# Patient Record
Sex: MALE | Race: WHITE | NOT HISPANIC OR LATINO | Employment: FULL TIME
[De-identification: names, ages, dates, MRNs, and addresses within clinical notes are randomized per-mention and may not be internally consistent; named-entity substitution may affect disease eponyms.]

---

## 2024-07-08 ENCOUNTER — TRANSCRIBE ORDERS (OUTPATIENT)
Dept: OTHER | Age: 79
End: 2024-07-08

## 2024-07-08 DIAGNOSIS — R74.8 ELEVATED LIVER ENZYMES: Primary | ICD-10-CM

## 2024-07-08 DIAGNOSIS — K75.81 STEATOHEPATITIS: ICD-10-CM

## 2024-07-10 NOTE — CONFIDENTIAL NOTE
DIAGNOSIS:   Elevated liver enzymes   Steatohepatitis      Appt Date:  08.01.2024    NOTES STATUS DETAILS   OFFICE NOTE from referring provider Care Everywhere 07.02.2024  Dr [facility]   [facility]      MEDICATION LIST Care Everywhere    IMAGING     ULTRASOUND LIVER Care Everywhere 06.17.2024 [facility]    LABS     HEPATIC PANEL (LIVER PANEL) Care Everywhere 06.10.2024   BASIC METABOLIC PANEL Care Everywhere 06.10.2024    COMPLETE METABOLIC PANEL Care Everywhere 04.05.2024   COMPLETE BLOOD COUNT (CBC) Care Everywhere 04.05.2024      Action 07.10.2024 rm   Action Taken Called [facility] at [phone] image received and uploaded to Pac[facility].

## 2024-07-16 DIAGNOSIS — K76.0 FATTY LIVER: ICD-10-CM

## 2024-07-16 DIAGNOSIS — R79.89 ABNORMAL LFTS: Primary | ICD-10-CM

## 2024-07-16 DIAGNOSIS — Z11.59 ENCOUNTER FOR SCREENING FOR OTHER VIRAL DISEASES: ICD-10-CM

## 2024-08-01 ENCOUNTER — LAB (OUTPATIENT)
Dept: LAB | Facility: CLINIC | Age: 79
End: 2024-08-01
Payer: COMMERCIAL

## 2024-08-01 ENCOUNTER — TELEPHONE (OUTPATIENT)
Dept: GASTROENTEROLOGY | Facility: CLINIC | Age: 79
End: 2024-08-01

## 2024-08-01 ENCOUNTER — OFFICE VISIT (OUTPATIENT)
Dept: GASTROENTEROLOGY | Facility: CLINIC | Age: 79
End: 2024-08-01
Attending: INTERNAL MEDICINE
Payer: COMMERCIAL

## 2024-08-01 ENCOUNTER — PRE VISIT (OUTPATIENT)
Dept: GASTROENTEROLOGY | Facility: CLINIC | Age: 79
End: 2024-08-01

## 2024-08-01 VITALS
DIASTOLIC BLOOD PRESSURE: 83 MMHG | OXYGEN SATURATION: 95 % | HEART RATE: 74 BPM | SYSTOLIC BLOOD PRESSURE: 135 MMHG | WEIGHT: 199 LBS | RESPIRATION RATE: 18 BRPM | TEMPERATURE: 97.8 F

## 2024-08-01 DIAGNOSIS — R74.8 ELEVATED LIVER ENZYMES: ICD-10-CM

## 2024-08-01 DIAGNOSIS — R79.89 ABNORMAL LFTS: ICD-10-CM

## 2024-08-01 DIAGNOSIS — K76.0 FATTY LIVER: ICD-10-CM

## 2024-08-01 DIAGNOSIS — Z11.59 ENCOUNTER FOR SCREENING FOR OTHER VIRAL DISEASES: ICD-10-CM

## 2024-08-01 DIAGNOSIS — R74.8 ELEVATED LIVER ENZYMES: Primary | ICD-10-CM

## 2024-08-01 DIAGNOSIS — K75.81 STEATOHEPATITIS: ICD-10-CM

## 2024-08-01 PROBLEM — S06.5XAA SDH (SUBDURAL HEMATOMA) (H): Status: ACTIVE | Noted: 2024-04-04

## 2024-08-01 PROBLEM — E11.9 DM (DIABETES MELLITUS), TYPE 2 (H): Status: ACTIVE | Noted: 2023-06-09

## 2024-08-01 LAB
ALBUMIN SERPL BCG-MCNC: 3.9 G/DL (ref 3.5–5.2)
ALP SERPL-CCNC: 184 U/L (ref 40–150)
ALT SERPL W P-5'-P-CCNC: 342 U/L (ref 0–70)
ANION GAP SERPL CALCULATED.3IONS-SCNC: 8 MMOL/L (ref 7–15)
AST SERPL W P-5'-P-CCNC: 365 U/L (ref 0–45)
BILIRUB DIRECT SERPL-MCNC: 0.24 MG/DL (ref 0–0.3)
BILIRUB SERPL-MCNC: 0.7 MG/DL
BUN SERPL-MCNC: 19.3 MG/DL (ref 8–23)
CALCIUM SERPL-MCNC: 9.1 MG/DL (ref 8.8–10.4)
CHLORIDE SERPL-SCNC: 106 MMOL/L (ref 98–107)
CREAT SERPL-MCNC: 1.16 MG/DL (ref 0.67–1.17)
EGFRCR SERPLBLD CKD-EPI 2021: 64 ML/MIN/1.73M2
ERYTHROCYTE [DISTWIDTH] IN BLOOD BY AUTOMATED COUNT: 15.6 % (ref 10–15)
GLUCOSE SERPL-MCNC: 104 MG/DL (ref 70–99)
HBV CORE AB SERPL QL IA: NONREACTIVE
HBV SURFACE AB SERPL IA-ACNC: 6.61 M[IU]/ML
HBV SURFACE AB SERPL IA-ACNC: NONREACTIVE M[IU]/ML
HBV SURFACE AG SERPL QL IA: NONREACTIVE
HCO3 SERPL-SCNC: 27 MMOL/L (ref 22–29)
HCT VFR BLD AUTO: 42.1 % (ref 40–53)
HCV AB SERPL QL IA: NONREACTIVE
HGB BLD-MCNC: 13.3 G/DL (ref 13.3–17.7)
INR PPP: 1.15 (ref 0.85–1.15)
MCH RBC QN AUTO: 28.3 PG (ref 26.5–33)
MCHC RBC AUTO-ENTMCNC: 31.6 G/DL (ref 31.5–36.5)
MCV RBC AUTO: 90 FL (ref 78–100)
PLATELET # BLD AUTO: 180 10E3/UL (ref 150–450)
POTASSIUM SERPL-SCNC: 3.7 MMOL/L (ref 3.4–5.3)
PROT SERPL-MCNC: 8.2 G/DL (ref 6.4–8.3)
RBC # BLD AUTO: 4.7 10E6/UL (ref 4.4–5.9)
SODIUM SERPL-SCNC: 141 MMOL/L (ref 135–145)
WBC # BLD AUTO: 5.8 10E3/UL (ref 4–11)

## 2024-08-01 PROCEDURE — 86706 HEP B SURFACE ANTIBODY: CPT | Performed by: INTERNAL MEDICINE

## 2024-08-01 PROCEDURE — 36415 COLL VENOUS BLD VENIPUNCTURE: CPT | Performed by: PATHOLOGY

## 2024-08-01 PROCEDURE — 83516 IMMUNOASSAY NONANTIBODY: CPT | Mod: 90 | Performed by: PATHOLOGY

## 2024-08-01 PROCEDURE — 82248 BILIRUBIN DIRECT: CPT | Performed by: PATHOLOGY

## 2024-08-01 PROCEDURE — 85610 PROTHROMBIN TIME: CPT | Performed by: PATHOLOGY

## 2024-08-01 PROCEDURE — 86704 HEP B CORE ANTIBODY TOTAL: CPT | Performed by: INTERNAL MEDICINE

## 2024-08-01 PROCEDURE — 86803 HEPATITIS C AB TEST: CPT | Performed by: INTERNAL MEDICINE

## 2024-08-01 PROCEDURE — G0463 HOSPITAL OUTPT CLINIC VISIT: HCPCS | Performed by: INTERNAL MEDICINE

## 2024-08-01 PROCEDURE — 87340 HEPATITIS B SURFACE AG IA: CPT | Performed by: INTERNAL MEDICINE

## 2024-08-01 PROCEDURE — 85027 COMPLETE CBC AUTOMATED: CPT | Performed by: PATHOLOGY

## 2024-08-01 PROCEDURE — 99204 OFFICE O/P NEW MOD 45 MIN: CPT | Performed by: INTERNAL MEDICINE

## 2024-08-01 PROCEDURE — 99000 SPECIMEN HANDLING OFFICE-LAB: CPT | Performed by: PATHOLOGY

## 2024-08-01 PROCEDURE — 80053 COMPREHEN METABOLIC PANEL: CPT | Performed by: PATHOLOGY

## 2024-08-01 PROCEDURE — 86381 MITOCHONDRIAL ANTIBODY EACH: CPT | Performed by: INTERNAL MEDICINE

## 2024-08-01 PROCEDURE — 86256 FLUORESCENT ANTIBODY TITER: CPT | Mod: 90 | Performed by: PATHOLOGY

## 2024-08-01 RX ORDER — ASPIRIN 325 MG
325 TABLET, DELAYED RELEASE (ENTERIC COATED) ORAL DAILY
COMMUNITY

## 2024-08-01 RX ORDER — ATORVASTATIN CALCIUM 40 MG/1
1 TABLET, FILM COATED ORAL DAILY
COMMUNITY
Start: 2024-06-12

## 2024-08-01 RX ORDER — METOPROLOL TARTRATE 25 MG/1
1 TABLET, FILM COATED ORAL 2 TIMES DAILY
COMMUNITY
Start: 2024-05-28

## 2024-08-01 ASSESSMENT — PAIN SCALES - GENERAL: PAINLEVEL: NO PAIN (0)

## 2024-08-01 NOTE — LETTER
8/1/2024      Haim Araya  [address]  [place] [address]      Dear Colleague,    Thank you for referring your patient, Haim Araya, to the [facility] [place]. Please see a copy of my visit note below.    [facility]       Hepatology Clinic    Date of Service: 8/1/2024       Primary Care Provider: Segundo Corona    History of Present Illness     Mr. Araya is sent in consultation by Dr. Corona because of abnormal liver enzymes.    His abnormal liver tests were apparently discovered incidentally on routine labs. He reports no knowledge of prior liver problems.  There is no family history of liver disease.  He reports no significant alcohol use.    His constitutional, GI, and hepatic review of systems are negative.  He recently had a leg fracture and is undergoing physical therapy for that.    He has a history of a CVA in 2014, but reports no recent symptoms.  He is on daily aspirin for this.    He owns a small electronics company.      Past Medical History:  No past medical history on file.    Patient Active Problem List   Diagnosis     Cerebral infarction (H)     DM (diabetes mellitus), type 2 (H)     Dyslipidemia     GERD (gastroesophageal reflux disease)     HTN (hypertension)     SDH (subdural hematoma) (H)       Surgical History:  No past surgical history on file.    Social History:  Social History     Tobacco Use     Smoking status: Never     Smokeless tobacco: Never   Substance Use Topics     Alcohol use: Not Currently     Drug use: Not Currently       Family History:  No family history on file.    Medications:  Current Outpatient Medications   Medication Sig Dispense Refill     aspirin (ASA) 325 MG EC tablet Take 325 mg by mouth daily       atorvastatin (LIPITOR) 40 MG tablet Take 1 tablet by mouth daily       metoprolol tartrate (LOPRESSOR) 25 MG tablet Take 1 tablet by mouth 2 times daily       Multiple  Vitamins-Minerals (SENIOR MULTIVITAMIN PLUS PO) Take 1 tablet by mouth daily       No current facility-administered medications for this visit.         Objective:         Vitals:    08/01/24 1207   BP: 135/83   BP Location: Right arm   Patient Position: Sitting   Cuff Size: Adult Regular   Pulse: 74   Resp: 18   Temp: 97.8  F (36.6  C)   TempSrc: Oral   SpO2: 95%   Weight: 90.3 kg (199 lb)     There is no height or weight on file to calculate BMI.     Physical Exam  Constitutional: Talkative, well-developed, well-nourished, in no apparent distress.    HEENT: Normocephalic. No scleral icterus.   Abd: Not distended, non-tender. No overt hepatosplenomegaly.     Skin:  No rash noted.  No spider nevi noted.    Peripheral Vascular: No lower extremity edema.   Musculoskeletal:  ROM intact, grossly normal muscle bulk    Psychiatric: Normal mood and affect. Behavior is normal.  Neuro: No asterixis    Labs and Diagnostic tests:  Lab Results   Component Value Date     08/01/2024    POTASSIUM 3.7 08/01/2024    CHLORIDE 106 08/01/2024    CO2 27 08/01/2024    BUN 19.3 08/01/2024    CR 1.16 08/01/2024     Lab Results   Component Value Date    BILITOTAL 0.7 08/01/2024     08/01/2024     08/01/2024    ALKPHOS 184 08/01/2024     Lab Results   Component Value Date    ALBUMIN 3.9 08/01/2024    PROTTOTAL 8.2 08/01/2024      Lab Results   Component Value Date    WBC 5.8 08/01/2024    HGB 13.3 08/01/2024    MCV 90 08/01/2024     08/01/2024     Lab Results   Component Value Date    INR 1.15 08/01/2024       MELD 3.0: 9 at 8/1/2024 11:13 AM  MELD-Na: 10 at 8/1/2024 11:13 AM  Calculated from:  Serum Creatinine: 1.16 mg/dL at 8/1/2024 11:13 AM  Serum Sodium: 141 mmol/L (Using max of 137 mmol/L) at 8/1/2024 11:13 AM  Total Bilirubin: 0.7 mg/dL (Using min of 1 mg/dL) at 8/1/2024 11:13 AM  Serum Albumin: 3.9 g/dL (Using max of 3.5 g/dL) at 8/1/2024 11:13 AM  INR(ratio): 1.15 at 8/1/2024 11:13 AM  Age at listing  (hypothetical): 79 years  Sex: Male at 8/1/2024 11:13 AM    US ([facility]) 6-17-24  Impression    1.  Hepatic steatosis. No liver mass. Consider steatohepatitis.  2.  Cholelithiasis.    Iron sat and ceruloplasmin normal.  HCV negative,       Assessment and Plan:    Abnormal liver enzymes.  These have been present for a number of years but have worsened recently.  The patient reports no new medications.  In the past, he had taken significant doses of acetaminophen, but has not been on this recently.    His ultrasound suggest steatosis of the liver, which certainly could be a contributing factor.  However, given the degree of liver enzyme elevation, I think it is reasonable to proceed with a liver biopsy.  We discussed this at some length, and the patient is willing to proceed.    Prior testing has not shown evidence of hepatitis B or C in this patient.  Of note, his wife has had hepatitis B, and he does not recall being vaccinated for this.  I suggest that he get this vaccination through Dr. Corona's office.    Pending tests today include a mitochondrial antibody and smooth muscle antibody.    I will plan on seeing this patient back in clinic after the liver biopsy.    His many questions were answered.      About 45 minutes spent today with patient, reviewing results, and coordinating care.    This note was created with voice recognition software, and while reviewed for accuracy, typos may remain.        Hamlet Decker MD  Professor of Medicine  [facility]  Division of Gastroenterology, Hepatology, and Nutrition       Again, thank you for allowing me to participate in the care of your patient.        Sincerely,        Hamlet Decker MD

## 2024-08-01 NOTE — PROGRESS NOTES
[facility]       Hepatology Clinic    Date of Service: 8/1/2024       Primary Care Provider: Segundo Corona    History of Present Illness     Mr. Araya is sent in consultation by Dr. Corona because of abnormal liver enzymes.    His abnormal liver tests were apparently discovered incidentally on routine labs. He reports no knowledge of prior liver problems.  There is no family history of liver disease.  He reports no significant alcohol use.    His constitutional, GI, and hepatic review of systems are negative.  He recently had a leg fracture and is undergoing physical therapy for that.    He has a history of a CVA in 2014, but reports no recent symptoms.  He is on daily aspirin for this.    He owns a small electronics company.      Past Medical History:  No past medical history on file.    Patient Active Problem List   Diagnosis    Cerebral infarction (H)    DM (diabetes mellitus), type 2 (H)    Dyslipidemia    GERD (gastroesophageal reflux disease)    HTN (hypertension)    SDH (subdural hematoma) (H)       Surgical History:  No past surgical history on file.    Social History:  Social History     Tobacco Use    Smoking status: Never    Smokeless tobacco: Never   Substance Use Topics    Alcohol use: Not Currently    Drug use: Not Currently       Family History:  No family history on file.    Medications:  Current Outpatient Medications   Medication Sig Dispense Refill    aspirin (ASA) 325 MG EC tablet Take 325 mg by mouth daily      atorvastatin (LIPITOR) 40 MG tablet Take 1 tablet by mouth daily      metoprolol tartrate (LOPRESSOR) 25 MG tablet Take 1 tablet by mouth 2 times daily      Multiple Vitamins-Minerals (SENIOR MULTIVITAMIN PLUS PO) Take 1 tablet by mouth daily       No current facility-administered medications for this visit.         Objective:         Vitals:    08/01/24 1207   BP: 135/83   BP Location: Right arm   Patient Position: Sitting   Cuff Size: Adult  Regular   Pulse: 74   Resp: 18   Temp: 97.8  F (36.6  C)   TempSrc: Oral   SpO2: 95%   Weight: 90.3 kg (199 lb)     There is no height or weight on file to calculate BMI.     Physical Exam  Constitutional: Talkative, well-developed, well-nourished, in no apparent distress.    HEENT: Normocephalic. No scleral icterus.   Abd: Not distended, non-tender. No overt hepatosplenomegaly.     Skin:  No rash noted.  No spider nevi noted.    Peripheral Vascular: No lower extremity edema.   Musculoskeletal:  ROM intact, grossly normal muscle bulk    Psychiatric: Normal mood and affect. Behavior is normal.  Neuro: No asterixis    Labs and Diagnostic tests:  Lab Results   Component Value Date     08/01/2024    POTASSIUM 3.7 08/01/2024    CHLORIDE 106 08/01/2024    CO2 27 08/01/2024    BUN 19.3 08/01/2024    CR 1.16 08/01/2024     Lab Results   Component Value Date    BILITOTAL 0.7 08/01/2024     08/01/2024     08/01/2024    ALKPHOS 184 08/01/2024     Lab Results   Component Value Date    ALBUMIN 3.9 08/01/2024    PROTTOTAL 8.2 08/01/2024      Lab Results   Component Value Date    WBC 5.8 08/01/2024    HGB 13.3 08/01/2024    MCV 90 08/01/2024     08/01/2024     Lab Results   Component Value Date    INR 1.15 08/01/2024       MELD 3.0: 9 at 8/1/2024 11:13 AM  MELD-Na: 10 at 8/1/2024 11:13 AM  Calculated from:  Serum Creatinine: 1.16 mg/dL at 8/1/2024 11:13 AM  Serum Sodium: 141 mmol/L (Using max of 137 mmol/L) at 8/1/2024 11:13 AM  Total Bilirubin: 0.7 mg/dL (Using min of 1 mg/dL) at 8/1/2024 11:13 AM  Serum Albumin: 3.9 g/dL (Using max of 3.5 g/dL) at 8/1/2024 11:13 AM  INR(ratio): 1.15 at 8/1/2024 11:13 AM  Age at listing (hypothetical): 79 years  Sex: Male at 8/1/2024 11:13 AM    US ([facility]) 6-17-24  Impression    1.  Hepatic steatosis. No liver mass. Consider steatohepatitis.  2.  Cholelithiasis.    Iron sat and ceruloplasmin normal.  HCV negative,       Assessment and Plan:    Abnormal liver enzymes.   These have been present for a number of years but have worsened recently.  The patient reports no new medications.  In the past, he had taken significant doses of acetaminophen, but has not been on this recently.    His ultrasound suggest steatosis of the liver, which certainly could be a contributing factor.  However, given the degree of liver enzyme elevation, I think it is reasonable to proceed with a liver biopsy.  We discussed this at some length, and the patient is willing to proceed.    Prior testing has not shown evidence of hepatitis B or C in this patient.  Of note, his wife has had hepatitis B, and he does not recall being vaccinated for this.  I suggest that he get this vaccination through Dr. Corona's office.    Pending tests today include a mitochondrial antibody and smooth muscle antibody.    I will plan on seeing this patient back in clinic after the liver biopsy.    His many questions were answered.      About 45 minutes spent today with patient, reviewing results, and coordinating care.    This note was created with voice recognition software, and while reviewed for accuracy, typos may remain.        Hamlet Decker MD  Professor of Medicine  [facility]  Division of Gastroenterology, Hepatology, and Nutrition

## 2024-08-01 NOTE — PATIENT INSTRUCTIONS
Summary:    1.  The cause of your elevated liver enzyme tests is unclear.  Steatotic liver disease (MASLD, formally known as fatty liver) is one possibility.  However, I think it is reasonable to proceed with a liver biopsy, as we discussed, to investigate other possibilities.  This has been ordered, you will be contacted about scheduling.    2.  We will plan on a follow-up appointment in later August to discuss your results.  This might be scheduled at the [facility].    If you have any questions about your liver disease care or tests, you can call the clinic at [phone].

## 2024-08-01 NOTE — TELEPHONE ENCOUNTER
Contacted [facility] to push 4/4/24 CT chest/abdomen/pelvis to PACS for liver biopsy planning. Per film room, image cannot be pushed via PACS. Sending a disk would be an option.     Addendum: Formal request made for 4/4/24 CT chest/abdomen/pelvis be mailed to hepatology.     JORGE HooperN, RN, PHN  Hepatology Clinic  Cl[facility]ics [facility] Center  [facility]

## 2024-08-02 LAB — MITOCHONDRIA M2 IGG SER-ACNC: 3.2 U/ML

## 2024-08-02 NOTE — TELEPHONE ENCOUNTER
TAWANDA left with imaging request with [facility] to ask if CD was overnighted.    JORGE HooperN, RN, PHN  Hepatology Clinic  Cl[facility]ics [facility] Surgery Center  [facility]

## 2024-08-04 LAB
SMA IGG SER-ACNC: >100 UNITS
SMOOTH MUSCLE IGG TITR SER: NORMAL {TITER}

## 2024-08-05 NOTE — RESULT ENCOUNTER NOTE
Mr. Araya:    The elevated F-actin antibody suggests that you might have a condition called autoimmune hepatitis, and the liver biopsy will help sort things out. However, this antibody test can sometimes be elevated in patients without a liver problem.    We can discuss things further when we meet you back in clinic after your liver biopsy.    If you have any questions about your liver disease care or tests, you can call the clinic at [phone].     - Dr. Decker

## 2024-08-06 ENCOUNTER — TELEPHONE (OUTPATIENT)
Dept: GASTROENTEROLOGY | Facility: CLINIC | Age: 79
End: 2024-08-06
Payer: COMMERCIAL

## 2024-08-06 NOTE — TELEPHONE ENCOUNTER
Multiple phone calls made to film room team for update on the CD that needs to be sent ASAP for liver biopsy prep.    SALLY Hooper, RN, PHN  Hepatology Clinic  Clinics & Surgery Center  [facility]

## 2024-08-07 ENCOUNTER — DOCUMENTATION ONLY (OUTPATIENT)
Dept: GASTROENTEROLOGY | Facility: CLINIC | Age: 79
End: 2024-08-07
Payer: COMMERCIAL

## 2024-08-07 NOTE — CONFIDENTIAL NOTE
Action 08.07.2024 rm   Action Taken Received disc from [facility], took to 4n to be uploaded to PACS.     Action 08.12.2024 RM   Action Taken Imaging disc received from [facility] took to 4 n to be uploaded to PACS

## 2024-08-18 ENCOUNTER — HEALTH MAINTENANCE LETTER (OUTPATIENT)
Age: 79
End: 2024-08-18

## 2024-08-20 ENCOUNTER — HOSPITAL ENCOUNTER (OUTPATIENT)
Facility: AMBULATORY SURGERY CENTER | Age: 79
Discharge: HOME | End: 2024-08-20
Attending: RADIOLOGY | Admitting: RADIOLOGY
Payer: COMMERCIAL

## 2024-08-20 ENCOUNTER — ANCILLARY PROCEDURE (OUTPATIENT)
Dept: RADIOLOGY | Facility: AMBULATORY SURGERY CENTER | Age: 79
End: 2024-08-20
Attending: INTERNAL MEDICINE
Payer: COMMERCIAL

## 2024-08-20 VITALS
BODY MASS INDEX: 27.99 KG/M2 | WEIGHT: 195.5 LBS | HEIGHT: 70 IN | SYSTOLIC BLOOD PRESSURE: 126 MMHG | TEMPERATURE: 97 F | OXYGEN SATURATION: 95 % | DIASTOLIC BLOOD PRESSURE: 85 MMHG | HEART RATE: 85 BPM | RESPIRATION RATE: 16 BRPM

## 2024-08-20 DIAGNOSIS — R74.8 ELEVATED LIVER ENZYMES: ICD-10-CM

## 2024-08-20 PROCEDURE — 99152 MOD SED SAME PHYS/QHP 5/>YRS: CPT | Performed by: RADIOLOGY

## 2024-08-20 PROCEDURE — 88313 SPECIAL STAINS GROUP 2: CPT | Mod: 26 | Performed by: PATHOLOGY

## 2024-08-20 PROCEDURE — 88313 SPECIAL STAINS GROUP 2: CPT | Mod: TC | Performed by: RADIOLOGY

## 2024-08-20 PROCEDURE — 76942 ECHO GUIDE FOR BIOPSY: CPT | Mod: 26 | Performed by: RADIOLOGY

## 2024-08-20 PROCEDURE — 88307 TISSUE EXAM BY PATHOLOGIST: CPT | Mod: 26 | Performed by: PATHOLOGY

## 2024-08-20 PROCEDURE — 47000 NEEDLE BIOPSY OF LIVER PERQ: CPT | Performed by: RADIOLOGY

## 2024-08-20 PROCEDURE — 47000 NEEDLE BIOPSY OF LIVER PERQ: CPT

## 2024-08-20 RX ORDER — NALOXONE HYDROCHLORIDE 0.4 MG/ML
0.4 INJECTION, SOLUTION INTRAMUSCULAR; INTRAVENOUS; SUBCUTANEOUS
Status: DISCONTINUED | OUTPATIENT
Start: 2024-08-20 | End: 2024-08-21 | Stop reason: HOSPADM

## 2024-08-20 RX ORDER — FLUMAZENIL 0.1 MG/ML
0.2 INJECTION, SOLUTION INTRAVENOUS
Status: DISCONTINUED | OUTPATIENT
Start: 2024-08-20 | End: 2024-08-21 | Stop reason: HOSPADM

## 2024-08-20 RX ORDER — SODIUM CHLORIDE 9 MG/ML
INJECTION, SOLUTION INTRAVENOUS CONTINUOUS
Status: DISCONTINUED | OUTPATIENT
Start: 2024-08-20 | End: 2024-08-21 | Stop reason: HOSPADM

## 2024-08-20 RX ORDER — NALOXONE HYDROCHLORIDE 0.4 MG/ML
0.2 INJECTION, SOLUTION INTRAMUSCULAR; INTRAVENOUS; SUBCUTANEOUS
Status: DISCONTINUED | OUTPATIENT
Start: 2024-08-20 | End: 2024-08-21 | Stop reason: HOSPADM

## 2024-08-20 RX ORDER — FENTANYL CITRATE 50 UG/ML
25-50 INJECTION, SOLUTION INTRAMUSCULAR; INTRAVENOUS EVERY 5 MIN PRN
Status: DISCONTINUED | OUTPATIENT
Start: 2024-08-20 | End: 2024-08-21 | Stop reason: HOSPADM

## 2024-08-20 RX ORDER — LIDOCAINE 40 MG/G
CREAM TOPICAL
Status: DISCONTINUED | OUTPATIENT
Start: 2024-08-20 | End: 2024-08-21 | Stop reason: HOSPADM

## 2024-08-20 NOTE — BRIEF OP NOTE
[facility]    Brief Operative Note    Pre-operative diagnosis: Elevated liver enzymes [R74.8]  Post-operative diagnosis Same as pre-operative diagnosis    Procedure: Percutaneous biopsy liver, Right - Abdomen    Surgeon: Surgeons and Role:     * Fareed Adams MD - Primary  Anesthesia: Moderate Sedation   Estimated Blood Loss: Minimal    Drains: None  Specimens:   ID Type Source Tests Collected by Time Destination   1 : Liver Biopsy x 3 Cores Biopsy Liver SURGICAL PATHOLOGY EXAM Fareed Adams MD 8/20/2024  9:18 AM      Findings:   None.  Complications: None.  Implants: * No implants in log *        17 gauge coaxial needle placed in liver via right intercostal approach. Three passes with a Apterano ACT biopsy needle through the coaxial needle and three good 18 gauge cores obtained and submitted in formalin. Good hemostasis with gelatin plugs. [id number] dressing.    1 mg Versed and 50 mcg Fentanyl IV.    Attending face-to-face sedation time: 12 minutes.

## 2024-08-20 NOTE — CONSULTS
Interventional Radiology Pre-Procedure Sedation Assessment   Time of Assessment: 8:37 AM    Expected Level: Moderate Sedation    Indication: Sedation is required for the following type of Procedure: Liver biopsy    Sedation and procedural consent: Risks, benefits and alternatives were discussed with Patient    PO Intake: Appropriately NPO for procedure    ASA Class: Class 2 - MILD SYSTEMIC DISEASE, NO ACUTE PROBLEMS, NO FUNCTIONAL LIMITATIONS.    Mallampati: Grade 2:  Soft palate, base of uvula, tonsillar pillars, and portion of posterior pharyngeal wall visible    Lungs: Lungs Clear with good breath sounds bilaterally    Heart: Normal heart sounds and rate    History and physical reviewed and no updates needed.Brief H&P completed prior to moderate sedation. I have reviewed the lab findings, diagnostic data, medications, and the plan for sedation. I have determined this patient to be an appropriate candidate for the planned sedation and procedure and have reassessed the patient IMMEDIATELY PRIOR to sedation and procedure.    Olena Montana PA-C

## 2024-08-20 NOTE — DISCHARGE INSTRUCTIONS
A collaboration between [facility] and [facility]  Experts in minimally invasive, targeted treatments performed using imaging guidance    Liver Biopsy    Today you had a needle remove a piece of tissue from your liver.    After you go home:  Keep any applied tape/gauze dressings clean and dry.  Change tape/gauze dressings if they get wet or soiled.  You may remove the dressing 48 hours after the procedure.  Don't shower until 48 hours after the procedure.  Do not perform strenuous activities or lift greater than 10 pounds for the next three days.  If there is bleeding or oozing from the procedure site, apply firm pressure to the area for 5-10 minutes.  If the bleeding continues seek medical advice at the numbers below.    For 24 hours after any sedation used:  Relax and take it easy.  No strenuous activities.  Do not drive or operate machines at home or at work.  No alcohol consumption.  Do not make any important or legal decisions.    Call our Interventional Radiology (IR) service if:  If you start bleeding from the procedure site.  If you do start to bleed from the site, lie down and hold some pressure on the site.  Our radiology provider can help you decide if you need to return to the hospital.  If you feel dizzy or lightheaded.  If you suddenly feel short of breath.  If you have persistent pain at biopsy site.  If the skin around the biopsy site is swollen, reddened, painful, or has any discharge.  If you feel nauseated and  just not right .  If you have a fever of greater than 100.5  F and chills in the first 5 days after procedure.  Any other concerns related to your procedure.    [facility]  Interventional Radiology (IR)  [address] [place] [address]    Contact Number:  [phone]  (IR control desk)  Monday - Friday 8:00 am - 4:30 pm    After hours for urgent concerns:   [phone]  After 4:30 pm Monday - Friday, Weekends and Holidays.   Ask for Interventional Radiology on-call.  Someone is available 24 hours a day.  [facility] toll free number:  [phone]

## 2024-08-20 NOTE — BRIEF OP NOTE
[facility] [place]    Brief Operative Note    Pre-operative diagnosis: Elevated liver enzymes [R74.8]  Post-operative diagnosis Same as pre-operative diagnosis    Procedure: Percutaneous biopsy liver, Right - Abdomen    Surgeon: Surgeons and Role:     * Fareed Adams MD - Primary  Anesthesia: Moderate Sedation   Estimated Blood Loss: Minimal    Drains: None  Specimens:   ID Type Source Tests Collected by Time Destination   1 : Liver Biopsy x 3 Cores Biopsy Liver SURGICAL PATHOLOGY EXAM Fareed Adams MD 8/20/2024  9:18 AM      Findings:   Ultrasound guided native liver biopsy, three passes, three core specimens obtained. Gelfoam administered into biopsy track. Plan for 1 hour of bedrest.   Complications: None.  Implants: * No implants in log *

## 2024-08-21 LAB
PATH REPORT.COMMENTS IMP SPEC: NORMAL
PATH REPORT.FINAL DX SPEC: NORMAL
PATH REPORT.GROSS SPEC: NORMAL
PATH REPORT.MICROSCOPIC SPEC OTHER STN: NORMAL
PATH REPORT.RELEVANT HX SPEC: NORMAL
PHOTO IMAGE: NORMAL

## 2024-08-22 ENCOUNTER — LAB (OUTPATIENT)
Dept: LAB | Facility: CLINIC | Age: 79
End: 2024-08-22
Payer: COMMERCIAL

## 2024-08-22 ENCOUNTER — TELEPHONE (OUTPATIENT)
Dept: GASTROENTEROLOGY | Facility: CLINIC | Age: 79
End: 2024-08-22

## 2024-08-22 DIAGNOSIS — R74.8 ELEVATED LIVER ENZYMES: ICD-10-CM

## 2024-08-22 DIAGNOSIS — K75.4 AUTOIMMUNE HEPATITIS (H): ICD-10-CM

## 2024-08-22 DIAGNOSIS — K75.4 AUTOIMMUNE HEPATITIS (H): Primary | ICD-10-CM

## 2024-08-22 DIAGNOSIS — R74.8 ELEVATED LIVER ENZYMES: Primary | ICD-10-CM

## 2024-08-22 LAB
ALBUMIN SERPL BCG-MCNC: 3.4 G/DL (ref 3.5–5.2)
ALP SERPL-CCNC: 132 U/L (ref 40–150)
ALT SERPL W P-5'-P-CCNC: 133 U/L (ref 0–70)
AST SERPL W P-5'-P-CCNC: 146 U/L (ref 0–45)
BILIRUB DIRECT SERPL-MCNC: 0.21 MG/DL (ref 0–0.3)
BILIRUB SERPL-MCNC: 0.7 MG/DL
PROT SERPL-MCNC: 7.7 G/DL (ref 6.4–8.3)

## 2024-08-22 PROCEDURE — 99000 SPECIMEN HANDLING OFFICE-LAB: CPT

## 2024-08-22 PROCEDURE — 84433 ASY THIOPURIN S-MTHYLTRNSFRS: CPT | Mod: 90

## 2024-08-22 PROCEDURE — 87517 HEPATITIS B DNA QUANT: CPT

## 2024-08-22 PROCEDURE — 80076 HEPATIC FUNCTION PANEL: CPT

## 2024-08-22 PROCEDURE — 36415 COLL VENOUS BLD VENIPUNCTURE: CPT

## 2024-08-22 RX ORDER — PREDNISONE 10 MG/1
40 TABLET ORAL DAILY
Qty: 72 TABLET | Refills: 0 | Status: SHIPPED | OUTPATIENT
Start: 2024-08-22 | End: 2024-09-05

## 2024-08-22 NOTE — RESULT ENCOUNTER NOTE
Mr. 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    I am recommending the following this for you:    1. I have ordered some blood tests to be drawn this week. Our clinic nurse will contact you about getting these done.    2. I have sent a prescription to your [facility] pharmacy for Prednisone 40 mg daily. When we meet in clinic on Sept. 3, we will discuss a long-term slow taper of this medication. We will also likely start you on a second medication, Azathioprine.    3. I strongly suggest that you contact your primary care clinic and get the hepatitis B vaccine series.    4. There is a small chance that your medications (e.g., metoprolol and atorvastatin) may be contributing to the hepatitis. However, I believe that you have been on these medications for a long time, so this is less likely.    We can discuss further questions when we see you back in clinic. If you have any questions about your liver disease care or tests, you can call the clinic at [phone].     - Dr. Decker

## 2024-08-22 NOTE — TELEPHONE ENCOUNTER
Called patient per Dr. Decker regarding labs.    Informed patient:  Reviewed liver biopsy result note with patient. Patient had no questions on this.    Dr. Decker requested patient complete labs this week and a few days prior to appointment on 9/3. Scheduled with patient.     JORGE HooperN, RN, PHN  Hepatology Clinic  Clinics & Surgery Center  [facility]

## 2024-08-23 LAB — HBV DNA SERPL NAA+PROBE-ACNC: NOT DETECTED IU/ML

## 2024-08-26 LAB — TPMT BLD-CCNC: 26 U/ML

## 2024-08-30 ENCOUNTER — APPOINTMENT (OUTPATIENT)
Dept: LAB | Facility: CLINIC | Age: 79
End: 2024-08-30
Payer: COMMERCIAL

## 2024-08-30 LAB
ALBUMIN SERPL BCG-MCNC: 3.3 G/DL (ref 3.5–5.2)
ALP SERPL-CCNC: 108 U/L (ref 40–150)
ALT SERPL W P-5'-P-CCNC: 64 U/L (ref 0–70)
ANION GAP SERPL CALCULATED.3IONS-SCNC: 6 MMOL/L (ref 7–15)
AST SERPL W P-5'-P-CCNC: 45 U/L (ref 0–45)
BILIRUB SERPL-MCNC: 0.4 MG/DL
BUN SERPL-MCNC: 25.9 MG/DL (ref 8–23)
CALCIUM SERPL-MCNC: 8.7 MG/DL (ref 8.8–10.4)
CHLORIDE SERPL-SCNC: 107 MMOL/L (ref 98–107)
CREAT SERPL-MCNC: 1.08 MG/DL (ref 0.67–1.17)
EGFRCR SERPLBLD CKD-EPI 2021: 70 ML/MIN/1.73M2
ERYTHROCYTE [DISTWIDTH] IN BLOOD BY AUTOMATED COUNT: 15.1 % (ref 10–15)
GLUCOSE SERPL-MCNC: 85 MG/DL (ref 70–99)
HCO3 SERPL-SCNC: 28 MMOL/L (ref 22–29)
HCT VFR BLD AUTO: 37.3 % (ref 40–53)
HGB BLD-MCNC: 11.7 G/DL (ref 13.3–17.7)
MCH RBC QN AUTO: 28.1 PG (ref 26.5–33)
MCHC RBC AUTO-ENTMCNC: 31.4 G/DL (ref 31.5–36.5)
MCV RBC AUTO: 89 FL (ref 78–100)
PLATELET # BLD AUTO: 206 10E3/UL (ref 150–450)
POTASSIUM SERPL-SCNC: 3.9 MMOL/L (ref 3.4–5.3)
PROT SERPL-MCNC: 7.2 G/DL (ref 6.4–8.3)
RBC # BLD AUTO: 4.17 10E6/UL (ref 4.4–5.9)
SODIUM SERPL-SCNC: 141 MMOL/L (ref 135–145)
WBC # BLD AUTO: 11.6 10E3/UL (ref 4–11)

## 2024-08-30 PROCEDURE — 36415 COLL VENOUS BLD VENIPUNCTURE: CPT

## 2024-08-30 PROCEDURE — 85027 COMPLETE CBC AUTOMATED: CPT

## 2024-08-30 PROCEDURE — 80053 COMPREHEN METABOLIC PANEL: CPT

## 2024-09-03 ENCOUNTER — OFFICE VISIT (OUTPATIENT)
Dept: GASTROENTEROLOGY | Facility: CLINIC | Age: 79
End: 2024-09-03
Payer: COMMERCIAL

## 2024-09-03 VITALS
HEIGHT: 70 IN | SYSTOLIC BLOOD PRESSURE: 145 MMHG | WEIGHT: 204 LBS | OXYGEN SATURATION: 97 % | HEART RATE: 76 BPM | DIASTOLIC BLOOD PRESSURE: 88 MMHG | BODY MASS INDEX: 29.2 KG/M2

## 2024-09-03 DIAGNOSIS — K75.4 AUTOIMMUNE HEPATITIS (H): Primary | ICD-10-CM

## 2024-09-03 PROCEDURE — 99213 OFFICE O/P EST LOW 20 MIN: CPT | Performed by: INTERNAL MEDICINE

## 2024-09-03 RX ORDER — AZATHIOPRINE 50 MG/1
50 TABLET ORAL DAILY
Qty: 90 TABLET | Refills: 3 | Status: SHIPPED | OUTPATIENT
Start: 2024-09-03

## 2024-09-03 ASSESSMENT — PAIN SCALES - GENERAL: PAINLEVEL: NO PAIN (0)

## 2024-09-03 NOTE — PROGRESS NOTES
[facility]       Hepatology Clinic    Date of Service: 9/3/2024       Primary Care Provider: Segundo Corona    History of Present Illness     Mr. Araya presents for follow-up of recently diagnoses autoimmune hepatitis.    He has been on prednisone 40 mg daily. He has noted some weight gain, but no other clear side effects. His liver tests were normal last week.  Overall, it appears that he feels well.          Past Medical History:  No past medical history on file.    Patient Active Problem List   Diagnosis    Cerebral infarction (H)    DM (diabetes mellitus), type 2 (H)    Dyslipidemia    GERD (gastroesophageal reflux disease)    HTN (hypertension)    SDH (subdural hematoma) (H)    Autoimmune hepatitis (H)       Surgical History:  Past Surgical History:   Procedure Laterality Date    IR LIVER BIOPSY PERCUTANEOUS  8/20/2024    PERCUTANEOUS BIOPSY LIVER Right 8/20/2024    Procedure: Percutaneous biopsy liver;  Surgeon: Fareed Adams MD;  [place] [facility] [place]       Social History:  Social History     Tobacco Use    Smoking status: Never    Smokeless tobacco: Never   Substance Use Topics    Alcohol use: Not Currently    Drug use: Not Currently       Family History:  No family history on file.    Medications:  Current Outpatient Medications   Medication Sig Dispense Refill    aspirin (ASA) 325 MG EC tablet Take 325 mg by mouth daily      atorvastatin (LIPITOR) 40 MG tablet Take 1 tablet by mouth daily      azaTHIOprine (IMURAN) 50 MG tablet Take 1 tablet (50 mg) by mouth daily. 90 tablet 3    metoprolol tartrate (LOPRESSOR) 25 MG tablet Take 1 tablet by mouth 2 times daily      Multiple Vitamins-Minerals (SENIOR MULTIVITAMIN PLUS PO) Take 1 tablet by mouth daily      predniSONE (DELTASONE) 10 MG tablet Take 4 tablets (40 mg) by mouth daily. You will likely be on this medication for several months, but we will discuss tapering this at your appointment 9-3-24. 72 tablet 0  "    No current facility-administered medications for this visit.         Objective:         Vitals:    09/03/24 1401   BP: (!) 145/88   BP Location: Left arm   Patient Position: Sitting   Cuff Size: Adult Large   Pulse: 76   SpO2: 97%   Weight: 92.5 kg (204 lb)   Height: 1.778 m (5' 10\")     Body mass index is 29.27 kg/m .     Physical Exam  Constitutional: Well-developed, well-nourished, in no apparent distress.     Psychiatric: Normal mood and affect. Behavior is normal. Very talkative      Labs and Diagnostic tests:  Lab Results   Component Value Date     08/30/2024    POTASSIUM 3.9 08/30/2024    CHLORIDE 107 08/30/2024    CO2 28 08/30/2024    BUN 25.9 08/30/2024    CR 1.08 08/30/2024     Lab Results   Component Value Date    BILITOTAL 0.4 08/30/2024    ALT 64 08/30/2024    AST 45 08/30/2024    ALKPHOS 108 08/30/2024     Lab Results   Component Value Date    ALBUMIN 3.3 08/30/2024    PROTTOTAL 7.2 08/30/2024      Lab Results   Component Value Date    WBC 11.6 08/30/2024    HGB 11.7 08/30/2024    MCV 89 08/30/2024     08/30/2024     Lab Results   Component Value Date    INR 1.15 08/01/2024       MELD 3.0: 9 at 8/1/2024 11:13 AM  MELD-Na: 10 at 8/1/2024 11:13 AM  Calculated from:  Serum Creatinine: 1.16 mg/dL at 8/1/2024 11:13 AM  Serum Sodium: 141 mmol/L (Using max of 137 mmol/L) at 8/1/2024 11:13 AM  Total Bilirubin: 0.7 mg/dL (Using min of 1 mg/dL) at 8/1/2024 11:13 AM  Serum Albumin: 3.9 g/dL (Using max of 3.5 g/dL) at 8/1/2024 11:13 AM  INR(ratio): 1.15 at 8/1/2024 11:13 AM  Age at listing (hypothetical): 79 years  Sex: Male at 8/1/2024 11:13 AM    LIVER, NEEDLE BIOPSY:  Hepatitis, activity grade 3 /4; no fibrosis; uncertain etiology:   -Etiologic considerations include primary autoimmune versus drug-induced hepatitis            (See Comment)           Component  Ref Range & Units 1 mo ago    F-Actin (Smooth Muscle) Ab, IgG by ROSARIO  0 - 19 Units >100 High           Assessment and Plan:    Likely " autoimmune hepatitis. He has responded nicely to prednisone, which supports the diagnosis of AIH. I have written a prescription for azathioprine 50 daily.     I am suggesting that he reduce prednisone to 30 mg daily for one week, and then 20 mg daily. We will see him back in early October, and will arrange for a slow taper of prednisone.    He had numerous questions today that were addressed.  I encouraged him to contact us if he develops any concerning side effects to the prednisone or azathioprine.  We will check labs in 2 weeks and prior to his return visit to clinic.          Follow Up:  Early October    About 25 minutes spent today with patient, reviewing results, and coordinating care.    This note was created with voice recognition software, and while reviewed for accuracy, typos may remain.        Hamlet Decker MD  Professor of Medicine  [facility]  Division of Gastroenterology, Hepatology, and Nutrition

## 2024-09-03 NOTE — PATIENT INSTRUCTIONS
Summary:    1.  Your liver enzymes have returned to normal on the prednisone, which strongly supports the diagnosis of autoimmune hepatitis.    2.  As we discussed, I suggest you take prednisone 30 mg a day for 1 week, and then 20 mg daily.  Please contact us if you need refills.    3.  I will plan on seeing you back in early October, while you are still on 20 mg daily of prednisone.  At that time, we will discuss slowly tapering her prednisone off.    4.  As we discussed, please start taking azathioprine 50 mg daily.  I have sent a prescription to your pharmacy.    If you have any questions about your liver disease care or tests, you can call the clinic at [phone].

## 2024-09-03 NOTE — LETTER
9/3/2024      Haim Araya  [address]  [place] [address]      Dear Colleague,    Thank you for referring your patient, Haim Araya, to the [facility] [place]. Please see a copy of my visit note below.    [facility]       Hepatology Clinic    Date of Service: 9/3/2024       Primary Care Provider: Segundo Corona    History of Present Illness     Mr. Araya presents for follow-up of recently diagnoses autoimmune hepatitis.    He has been on prednisone 40 mg daily. He has noted some weight gain, but no other clear side effects. His liver tests were normal last week.  Overall, it appears that he feels well.          Past Medical History:  No past medical history on file.    Patient Active Problem List   Diagnosis     Cerebral infarction (H)     DM (diabetes mellitus), type 2 (H)     Dyslipidemia     GERD (gastroesophageal reflux disease)     HTN (hypertension)     SDH (subdural hematoma) (H)     Autoimmune hepatitis (H)       Surgical History:  Past Surgical History:   Procedure Laterality Date     IR LIVER BIOPSY PERCUTANEOUS  8/20/2024     PERCUTANEOUS BIOPSY LIVER Right 8/20/2024    Procedure: Percutaneous biopsy liver;  Surgeon: Fareed Adams MD;  [place] [facility] [place]       Social History:  Social History     Tobacco Use     Smoking status: Never     Smokeless tobacco: Never   Substance Use Topics     Alcohol use: Not Currently     Drug use: Not Currently       Family History:  No family history on file.    Medications:  Current Outpatient Medications   Medication Sig Dispense Refill     aspirin (ASA) 325 MG EC tablet Take 325 mg by mouth daily       atorvastatin (LIPITOR) 40 MG tablet Take 1 tablet by mouth daily       azaTHIOprine (IMURAN) 50 MG tablet Take 1 tablet (50 mg) by mouth daily. 90 tablet 3     metoprolol tartrate (LOPRESSOR) 25 MG tablet Take 1 tablet by mouth 2 times daily       Multiple Vitamins-Minerals  "(SENIOR MULTIVITAMIN PLUS PO) Take 1 tablet by mouth daily       predniSONE (DELTASONE) 10 MG tablet Take 4 tablets (40 mg) by mouth daily. You will likely be on this medication for several months, but we will discuss tapering this at your appointment 9-3-24. 72 tablet 0     No current facility-administered medications for this visit.         Objective:         Vitals:    09/03/24 1401   BP: (!) 145/88   BP Location: Left arm   Patient Position: Sitting   Cuff Size: Adult Large   Pulse: 76   SpO2: 97%   Weight: 92.5 kg (204 lb)   Height: 1.778 m (5' 10\")     Body mass index is 29.27 kg/m .     Physical Exam  Constitutional: Well-developed, well-nourished, in no apparent distress.     Psychiatric: Normal mood and affect. Behavior is normal. Very talkative      Labs and Diagnostic tests:  Lab Results   Component Value Date     08/30/2024    POTASSIUM 3.9 08/30/2024    CHLORIDE 107 08/30/2024    CO2 28 08/30/2024    BUN 25.9 08/30/2024    CR 1.08 08/30/2024     Lab Results   Component Value Date    BILITOTAL 0.4 08/30/2024    ALT 64 08/30/2024    AST 45 08/30/2024    ALKPHOS 108 08/30/2024     Lab Results   Component Value Date    ALBUMIN 3.3 08/30/2024    PROTTOTAL 7.2 08/30/2024      Lab Results   Component Value Date    WBC 11.6 08/30/2024    HGB 11.7 08/30/2024    MCV 89 08/30/2024     08/30/2024     Lab Results   Component Value Date    INR 1.15 08/01/2024       MELD 3.0: 9 at 8/1/2024 11:13 AM  MELD-Na: 10 at 8/1/2024 11:13 AM  Calculated from:  Serum Creatinine: 1.16 mg/dL at 8/1/2024 11:13 AM  Serum Sodium: 141 mmol/L (Using max of 137 mmol/L) at 8/1/2024 11:13 AM  Total Bilirubin: 0.7 mg/dL (Using min of 1 mg/dL) at 8/1/2024 11:13 AM  Serum Albumin: 3.9 g/dL (Using max of 3.5 g/dL) at 8/1/2024 11:13 AM  INR(ratio): 1.15 at 8/1/2024 11:13 AM  Age at listing (hypothetical): 79 years  Sex: Male at 8/1/2024 11:13 AM    LIVER, NEEDLE BIOPSY:  Hepatitis, activity grade 3 /4; no fibrosis; uncertain " etiology:   -Etiologic considerations include primary autoimmune versus drug-induced hepatitis            (See Comment)           Component  Ref Range & Units 1 mo ago    F-Actin (Smooth Muscle) Ab, IgG by ROSARIO  0 - 19 Units >100 High           Assessment and Plan:    Likely autoimmune hepatitis. He has responded nicely to prednisone, which supports the diagnosis of AIH. I have written a prescription for azathioprine 50 daily.     I am suggesting that he reduce prednisone to 30 mg daily for one week, and then 20 mg daily. We will see him back in early October, and will arrange for a slow taper of prednisone.    He had numerous questions today that were addressed.  I encouraged him to contact us if he develops any concerning side effects to the prednisone or azathioprine.  We will check labs in 2 weeks and prior to his return visit to clinic.          Follow Up:  Early October    About 25 minutes spent today with patient, reviewing results, and coordinating care.    This note was created with voice recognition software, and while reviewed for accuracy, typos may remain.        Hamlet Decker MD  Professor of Medicine  [facility]  Division of Gastroenterology, Hepatology, and Nutrition       Again, thank you for allowing me to participate in the care of your patient.        Sincerely,        Hamlet Decker MD

## 2024-09-03 NOTE — NURSING NOTE
"Chief Complaint   Patient presents with    Follow Up     discuss liver biopsy       Vitals:    09/03/24 1401   BP: (!) 145/88   BP Location: Left arm   Patient Position: Sitting   Cuff Size: Adult Large   Pulse: 76   SpO2: 97%   Weight: 92.5 kg (204 lb)   Height: 1.778 m (5' 10\")       Body mass index is 29.27 kg/m .      SHEA Larsen    "

## 2024-09-05 DIAGNOSIS — R74.8 ELEVATED LIVER ENZYMES: ICD-10-CM

## 2024-09-05 DIAGNOSIS — K75.4 AUTOIMMUNE HEPATITIS (H): ICD-10-CM

## 2024-09-05 RX ORDER — PREDNISONE 10 MG/1
TABLET ORAL
Qty: 72 TABLET | Refills: 0 | Status: SHIPPED | OUTPATIENT
Start: 2024-09-05 | End: 2024-10-09

## 2024-09-17 ENCOUNTER — LAB (OUTPATIENT)
Dept: LAB | Facility: CLINIC | Age: 79
End: 2024-09-17
Payer: COMMERCIAL

## 2024-09-17 DIAGNOSIS — K75.4 AUTOIMMUNE HEPATITIS (H): ICD-10-CM

## 2024-09-17 LAB
ALBUMIN SERPL BCG-MCNC: 3.6 G/DL (ref 3.5–5.2)
ALP SERPL-CCNC: 83 U/L (ref 40–150)
ALT SERPL W P-5'-P-CCNC: 22 U/L (ref 0–70)
AST SERPL W P-5'-P-CCNC: 26 U/L (ref 0–45)
BILIRUB DIRECT SERPL-MCNC: 0.24 MG/DL (ref 0–0.3)
BILIRUB SERPL-MCNC: 0.8 MG/DL
ERYTHROCYTE [DISTWIDTH] IN BLOOD BY AUTOMATED COUNT: 15.7 % (ref 10–15)
HCT VFR BLD AUTO: 38.2 % (ref 40–53)
HGB BLD-MCNC: 12.3 G/DL (ref 13.3–17.7)
MCH RBC QN AUTO: 28.3 PG (ref 26.5–33)
MCHC RBC AUTO-ENTMCNC: 32.2 G/DL (ref 31.5–36.5)
MCV RBC AUTO: 88 FL (ref 78–100)
PLATELET # BLD AUTO: 130 10E3/UL (ref 150–450)
PROT SERPL-MCNC: 6.8 G/DL (ref 6.4–8.3)
RBC # BLD AUTO: 4.35 10E6/UL (ref 4.4–5.9)
WBC # BLD AUTO: 12.2 10E3/UL (ref 4–11)

## 2024-09-17 PROCEDURE — 80076 HEPATIC FUNCTION PANEL: CPT

## 2024-09-17 PROCEDURE — 85027 COMPLETE CBC AUTOMATED: CPT

## 2024-09-17 PROCEDURE — 36415 COLL VENOUS BLD VENIPUNCTURE: CPT

## 2024-09-18 DIAGNOSIS — K75.4 AUTOIMMUNE HEPATITIS (H): Primary | ICD-10-CM

## 2024-09-18 DIAGNOSIS — R74.8 ELEVATED LIVER ENZYMES: ICD-10-CM

## 2024-09-18 NOTE — RESULT ENCOUNTER NOTE
Mr. Araya:    Your liver tests look good. Your platelet count has decreased somewhat, which may be a result of the azathioprine. We will continue to monitor this.    I suggest that you have a repeat CBC and liver panel in about one week. Please contact the number below for help with arranging this.    If you have any questions about your liver disease care or tests, you can call the clinic at [phone].     - Dr. Decker

## 2024-09-25 ENCOUNTER — LAB (OUTPATIENT)
Dept: LAB | Facility: CLINIC | Age: 79
End: 2024-09-25
Payer: COMMERCIAL

## 2024-09-25 DIAGNOSIS — K75.4 AUTOIMMUNE HEPATITIS (H): ICD-10-CM

## 2024-09-25 DIAGNOSIS — R74.8 ELEVATED LIVER ENZYMES: ICD-10-CM

## 2024-09-25 LAB
ALBUMIN SERPL BCG-MCNC: 3.3 G/DL (ref 3.5–5.2)
ALP SERPL-CCNC: 77 U/L (ref 40–150)
ALT SERPL W P-5'-P-CCNC: 29 U/L (ref 0–70)
AST SERPL W P-5'-P-CCNC: 27 U/L (ref 0–45)
BILIRUB DIRECT SERPL-MCNC: <0.2 MG/DL (ref 0–0.3)
BILIRUB SERPL-MCNC: 0.6 MG/DL
ERYTHROCYTE [DISTWIDTH] IN BLOOD BY AUTOMATED COUNT: 15.7 % (ref 10–15)
HCT VFR BLD AUTO: 37.9 % (ref 40–53)
HGB BLD-MCNC: 11.9 G/DL (ref 13.3–17.7)
MCH RBC QN AUTO: 27.9 PG (ref 26.5–33)
MCHC RBC AUTO-ENTMCNC: 31.4 G/DL (ref 31.5–36.5)
MCV RBC AUTO: 89 FL (ref 78–100)
PLATELET # BLD AUTO: 133 10E3/UL (ref 150–450)
PROT SERPL-MCNC: 6.3 G/DL (ref 6.4–8.3)
RBC # BLD AUTO: 4.27 10E6/UL (ref 4.4–5.9)
WBC # BLD AUTO: 10.3 10E3/UL (ref 4–11)

## 2024-09-25 PROCEDURE — 36415 COLL VENOUS BLD VENIPUNCTURE: CPT

## 2024-09-25 PROCEDURE — 85027 COMPLETE CBC AUTOMATED: CPT

## 2024-09-25 PROCEDURE — 80076 HEPATIC FUNCTION PANEL: CPT

## 2024-09-26 NOTE — RESULT ENCOUNTER NOTE
Mr. Araya:    Your liver enzymes look good.  Your CBC is stable.  I suggest that we discuss things further when we see you back in clinic next month.    If you have any questions about your liver disease care or tests, you can call the clinic at [phone].     - Dr. Decker

## 2024-10-01 ENCOUNTER — TELEPHONE (OUTPATIENT)
Dept: GASTROENTEROLOGY | Facility: CLINIC | Age: 79
End: 2024-10-01
Payer: COMMERCIAL

## 2024-10-01 NOTE — TELEPHONE ENCOUNTER
Returned call.     Explained to patient writer can only see 1 azathioprine prescription under Dr. Decker and medication was sent to [facility]. Patient will call [facility] to clarify.    JORGE HooperN, RN, PHN  Hepatology Clinic  [facility]  [facility]

## 2024-10-01 NOTE — TELEPHONE ENCOUNTER
Health Call Center    Phone Message    May a detailed message be left on voicemail: yes     Reason for Call: Other: Tom is calling in asking for a call back. Pt states that he received a letter from [facility] pharmacy that they were unable to fill his azathioprine prescription, but Pt picked this up from [facility] pharmacy and would like to speak with his care team to ensure that no orders were sent to the wrong pharmacy. Please respond accordingly.     Action Taken: Message routed to:  [facility] ([facility]): Hep    Travel Screening: Not Applicable     Date of Service:

## 2024-10-07 ENCOUNTER — LAB (OUTPATIENT)
Dept: LAB | Facility: CLINIC | Age: 79
End: 2024-10-07
Payer: COMMERCIAL

## 2024-10-07 DIAGNOSIS — K75.4 AUTOIMMUNE HEPATITIS (H): ICD-10-CM

## 2024-10-07 LAB
ALBUMIN SERPL BCG-MCNC: 3.5 G/DL (ref 3.5–5.2)
ALP SERPL-CCNC: 75 U/L (ref 40–150)
ALT SERPL W P-5'-P-CCNC: 20 U/L (ref 0–70)
ANION GAP SERPL CALCULATED.3IONS-SCNC: 9 MMOL/L (ref 7–15)
AST SERPL W P-5'-P-CCNC: 25 U/L (ref 0–45)
BILIRUB SERPL-MCNC: 0.5 MG/DL
BUN SERPL-MCNC: 24.6 MG/DL (ref 8–23)
CALCIUM SERPL-MCNC: 8.9 MG/DL (ref 8.8–10.4)
CHLORIDE SERPL-SCNC: 105 MMOL/L (ref 98–107)
CREAT SERPL-MCNC: 1.04 MG/DL (ref 0.67–1.17)
EGFRCR SERPLBLD CKD-EPI 2021: 73 ML/MIN/1.73M2
ERYTHROCYTE [DISTWIDTH] IN BLOOD BY AUTOMATED COUNT: 15.8 % (ref 10–15)
GLUCOSE SERPL-MCNC: 176 MG/DL (ref 70–99)
HCO3 SERPL-SCNC: 27 MMOL/L (ref 22–29)
HCT VFR BLD AUTO: 39.2 % (ref 40–53)
HGB BLD-MCNC: 12.2 G/DL (ref 13.3–17.7)
MCH RBC QN AUTO: 27.7 PG (ref 26.5–33)
MCHC RBC AUTO-ENTMCNC: 31.1 G/DL (ref 31.5–36.5)
MCV RBC AUTO: 89 FL (ref 78–100)
PLATELET # BLD AUTO: 221 10E3/UL (ref 150–450)
POTASSIUM SERPL-SCNC: 3.9 MMOL/L (ref 3.4–5.3)
PROT SERPL-MCNC: 6.6 G/DL (ref 6.4–8.3)
RBC # BLD AUTO: 4.41 10E6/UL (ref 4.4–5.9)
SODIUM SERPL-SCNC: 141 MMOL/L (ref 135–145)
WBC # BLD AUTO: 10.9 10E3/UL (ref 4–11)

## 2024-10-07 PROCEDURE — 80053 COMPREHEN METABOLIC PANEL: CPT

## 2024-10-07 PROCEDURE — 85027 COMPLETE CBC AUTOMATED: CPT

## 2024-10-07 PROCEDURE — 36415 COLL VENOUS BLD VENIPUNCTURE: CPT

## 2024-10-08 ENCOUNTER — OFFICE VISIT (OUTPATIENT)
Dept: GASTROENTEROLOGY | Facility: CLINIC | Age: 79
End: 2024-10-08
Payer: COMMERCIAL

## 2024-10-08 VITALS
RESPIRATION RATE: 20 BRPM | BODY MASS INDEX: 28.55 KG/M2 | SYSTOLIC BLOOD PRESSURE: 136 MMHG | WEIGHT: 199 LBS | DIASTOLIC BLOOD PRESSURE: 87 MMHG | OXYGEN SATURATION: 98 % | HEART RATE: 77 BPM

## 2024-10-08 DIAGNOSIS — K75.4 AUTOIMMUNE HEPATITIS (H): ICD-10-CM

## 2024-10-08 DIAGNOSIS — K75.4 AUTOIMMUNE HEPATITIS (H): Primary | ICD-10-CM

## 2024-10-08 PROCEDURE — 99213 OFFICE O/P EST LOW 20 MIN: CPT | Performed by: INTERNAL MEDICINE

## 2024-10-08 RX ORDER — PREDNISONE 5 MG/1
TABLET ORAL
Qty: 49 TABLET | Refills: 0 | Status: SHIPPED | OUTPATIENT
Start: 2024-10-08 | End: 2024-11-19

## 2024-10-08 NOTE — NURSING NOTE
Chief Complaint   Patient presents with    RECHECK     Autoimmune hepatitis (H)       Vitals:    10/08/24 1400   BP: 136/87   BP Location: Left arm   Patient Position: Sitting   Cuff Size: Adult Large   Pulse: 77   Resp: 20   SpO2: 98%   Weight: 90.3 kg (199 lb)       Body mass index is 28.55 kg/m .

## 2024-10-08 NOTE — LETTER
10/8/2024      Haim Araya  [address]  [place] [address]      Dear Colleague,    Thank you for referring your patient, Haim Araya, to the [facility] [place]. Please see a copy of my visit note below.    [facility]       Hepatology Clinic    Date of Service: 10/8/2024       Primary Care Provider: Segundo Corona    History of Present Illness     Mr. Araya presents for follow-up of presumed autoimmune hepatitis.    He has been on azathioprine 50 mg daily and is currently on prednisone 20 mg daily.  His liver tests have returned to normal.  He has had mild anemia, but his current white count and platelet count are normal.    He reports no GI or hepatic symptoms.        Past Medical History:  No past medical history on file.    Patient Active Problem List   Diagnosis     Cerebral infarction (H)     DM (diabetes mellitus), type 2 (H)     Dyslipidemia     GERD (gastroesophageal reflux disease)     HTN (hypertension)     SDH (subdural hematoma) (H)     Autoimmune hepatitis (H)       Surgical History:  Past Surgical History:   Procedure Laterality Date     IR LIVER BIOPSY PERCUTANEOUS  8/20/2024     PERCUTANEOUS BIOPSY LIVER Right 8/20/2024    Procedure: Percutaneous biopsy liver;  Surgeon: Fareed Adams MD;  [place] [facility] [place]       Social History:  Social History     Tobacco Use     Smoking status: Never     Smokeless tobacco: Never   Substance Use Topics     Alcohol use: Not Currently     Drug use: Not Currently       Family History:  No family history on file.    Medications:  Current Outpatient Medications   Medication Sig Dispense Refill     aspirin (ASA) 325 MG EC tablet Take 325 mg by mouth daily       atorvastatin (LIPITOR) 40 MG tablet Take 1 tablet by mouth daily       azaTHIOprine (IMURAN) 50 MG tablet Take 1 tablet (50 mg) by mouth daily. 90 tablet 3     metoprolol tartrate (LOPRESSOR) 25 MG tablet Take 1 tablet by  mouth 2 times daily       Multiple Vitamins-Minerals (SENIOR MULTIVITAMIN PLUS PO) Take 1 tablet by mouth daily       predniSONE (DELTASONE) 10 MG tablet Take 3 tablets (30 mg) by mouth daily for 7 days, THEN 2 tablets (20 mg) daily for 27 days. 72 tablet 0     No current facility-administered medications for this visit.         Objective:         Vitals:    10/08/24 1400   BP: 136/87   BP Location: Left arm   Patient Position: Sitting   Cuff Size: Adult Large   Pulse: 77   Resp: 20   SpO2: 98%   Weight: 90.3 kg (199 lb)     Body mass index is 28.55 kg/m .     Physical Exam  Constitutional: Well-developed, well-nourished, in no apparent distress.          Labs and Diagnostic tests:  Lab Results   Component Value Date     10/07/2024    POTASSIUM 3.9 10/07/2024    CHLORIDE 105 10/07/2024    CO2 27 10/07/2024    BUN 24.6 10/07/2024    CR 1.04 10/07/2024     Lab Results   Component Value Date    BILITOTAL 0.5 10/07/2024    ALT 20 10/07/2024    AST 25 10/07/2024    ALKPHOS 75 10/07/2024     Lab Results   Component Value Date    ALBUMIN 3.5 10/07/2024    PROTTOTAL 6.6 10/07/2024      Lab Results   Component Value Date    WBC 10.9 10/07/2024    HGB 12.2 10/07/2024    MCV 89 10/07/2024     10/07/2024     Lab Results   Component Value Date    INR 1.15 08/01/2024       MELD 3.0: 9 at 8/1/2024 11:13 AM  MELD-Na: 10 at 8/1/2024 11:13 AM  Calculated from:  Serum Creatinine: 1.16 mg/dL at 8/1/2024 11:13 AM  Serum Sodium: 141 mmol/L (Using max of 137 mmol/L) at 8/1/2024 11:13 AM  Total Bilirubin: 0.7 mg/dL (Using min of 1 mg/dL) at 8/1/2024 11:13 AM  Serum Albumin: 3.9 g/dL (Using max of 3.5 g/dL) at 8/1/2024 11:13 AM  INR(ratio): 1.15 at 8/1/2024 11:13 AM  Age at listing (hypothetical): 79 years  Sex: Male at 8/1/2024 11:13 AM          Assessment and Plan:    Autoimmune hepatitis.  He has responded well to standard immunosuppression.  I am suggesting that he taper his prednisone over the next 6 weeks and have written  a prescription for this.  We will continue to monitor his liver tests and CBC every 3 weeks for now, and if these remain stable, we can increase this to every 3 months.    I will tentatively plan on seeing him back in 6 months, we can see him back sooner if issues arise.    He had many questions today.  I addressed many of these.  However, he had other questions that I did not have time to talk about (such as the mechanism of action of acetaminophen toxicity).  I recommended that he not take acetaminophen more than occasionally.        About 20 minutes spent today with patient, reviewing results, and coordinating care.    This note was created with voice recognition software, and while reviewed for accuracy, typos may remain.        Hamlet Decker MD  Professor of Medicine  [facility]  Division of Gastroenterology, Hepatology, and Nutrition       Again, thank you for allowing me to participate in the care of your patient.        Sincerely,        Hamlet Decker MD

## 2024-10-08 NOTE — PATIENT INSTRUCTIONS
Summary:    1.  Your liver enzyme tests have returned to normal on the prednisone and azathioprine, which is excellent.    2.  As we discussed, I suggest that you taper your prednisone as follows:  -10 mg daily for 2 weeks  -5 mg daily for 2 weeks  -2.5 mg daily for 2 weeks, then stop    I have written a prescription for this prednisone taper to be sent to your pharmacy.    3.  We will check your blood tests every 3 weeks for now.  If things remain stable, we will stretch this out to every 3 months.    4.  I am tentatively scheduling him for return to this clinic in 6 months.  However, if there are issues, we can see you sooner.    If you have any questions about your liver disease care or tests, you can call the clinic at [phone].

## 2024-10-08 NOTE — PROGRESS NOTES
[facility]       Hepatology Clinic    Date of Service: 10/8/2024       Primary Care Provider: Segundo Corona    History of Present Illness     Mr. Araya presents for follow-up of presumed autoimmune hepatitis.    He has been on azathioprine 50 mg daily and is currently on prednisone 20 mg daily.  His liver tests have returned to normal.  He has had mild anemia, but his current white count and platelet count are normal.    He reports no GI or hepatic symptoms.        Past Medical History:  No past medical history on file.    Patient Active Problem List   Diagnosis    Cerebral infarction (H)    DM (diabetes mellitus), type 2 (H)    Dyslipidemia    GERD (gastroesophageal reflux disease)    HTN (hypertension)    SDH (subdural hematoma) (H)    Autoimmune hepatitis (H)       Surgical History:  Past Surgical History:   Procedure Laterality Date    IR LIVER BIOPSY PERCUTANEOUS  8/20/2024    PERCUTANEOUS BIOPSY LIVER Right 8/20/2024    Procedure: Percutaneous biopsy liver;  Surgeon: Fareed Adams MD;  [place] [facility] [place]       Social History:  Social History     Tobacco Use    Smoking status: Never    Smokeless tobacco: Never   Substance Use Topics    Alcohol use: Not Currently    Drug use: Not Currently       Family History:  No family history on file.    Medications:  Current Outpatient Medications   Medication Sig Dispense Refill    aspirin (ASA) 325 MG EC tablet Take 325 mg by mouth daily      atorvastatin (LIPITOR) 40 MG tablet Take 1 tablet by mouth daily      azaTHIOprine (IMURAN) 50 MG tablet Take 1 tablet (50 mg) by mouth daily. 90 tablet 3    metoprolol tartrate (LOPRESSOR) 25 MG tablet Take 1 tablet by mouth 2 times daily      Multiple Vitamins-Minerals (SENIOR MULTIVITAMIN PLUS PO) Take 1 tablet by mouth daily      predniSONE (DELTASONE) 10 MG tablet Take 3 tablets (30 mg) by mouth daily for 7 days, THEN 2 tablets (20 mg) daily for 27 days. 72 tablet 0     No current  facility-administered medications for this visit.         Objective:         Vitals:    10/08/24 1400   BP: 136/87   BP Location: Left arm   Patient Position: Sitting   Cuff Size: Adult Large   Pulse: 77   Resp: 20   SpO2: 98%   Weight: 90.3 kg (199 lb)     Body mass index is 28.55 kg/m .     Physical Exam  Constitutional: Well-developed, well-nourished, in no apparent distress.          Labs and Diagnostic tests:  Lab Results   Component Value Date     10/07/2024    POTASSIUM 3.9 10/07/2024    CHLORIDE 105 10/07/2024    CO2 27 10/07/2024    BUN 24.6 10/07/2024    CR 1.04 10/07/2024     Lab Results   Component Value Date    BILITOTAL 0.5 10/07/2024    ALT 20 10/07/2024    AST 25 10/07/2024    ALKPHOS 75 10/07/2024     Lab Results   Component Value Date    ALBUMIN 3.5 10/07/2024    PROTTOTAL 6.6 10/07/2024      Lab Results   Component Value Date    WBC 10.9 10/07/2024    HGB 12.2 10/07/2024    MCV 89 10/07/2024     10/07/2024     Lab Results   Component Value Date    INR 1.15 08/01/2024       MELD 3.0: 9 at 8/1/2024 11:13 AM  MELD-Na: 10 at 8/1/2024 11:13 AM  Calculated from:  Serum Creatinine: 1.16 mg/dL at 8/1/2024 11:13 AM  Serum Sodium: 141 mmol/L (Using max of 137 mmol/L) at 8/1/2024 11:13 AM  Total Bilirubin: 0.7 mg/dL (Using min of 1 mg/dL) at 8/1/2024 11:13 AM  Serum Albumin: 3.9 g/dL (Using max of 3.5 g/dL) at 8/1/2024 11:13 AM  INR(ratio): 1.15 at 8/1/2024 11:13 AM  Age at listing (hypothetical): 79 years  Sex: Male at 8/1/2024 11:13 AM          Assessment and Plan:    Autoimmune hepatitis.  He has responded well to standard immunosuppression.  I am suggesting that he taper his prednisone over the next 6 weeks and have written a prescription for this.  We will continue to monitor his liver tests and CBC every 3 weeks for now, and if these remain stable, we can increase this to every 3 months.    I will tentatively plan on seeing him back in 6 months, we can see him back sooner if issues  arise.    He had many questions today.  I addressed many of these.  However, he had other questions that I did not have time to talk about (such as the mechanism of action of acetaminophen toxicity).  I recommended that he not take acetaminophen more than occasionally.        About 20 minutes spent today with patient, reviewing results, and coordinating care.    This note was created with voice recognition software, and while reviewed for accuracy, typos may remain.        Hamlet Decker MD  Professor of Medicine  [facility]  Division of Gastroenterology, Hepatology, and Nutrition

## 2024-10-29 ENCOUNTER — LAB (OUTPATIENT)
Dept: LAB | Facility: CLINIC | Age: 79
End: 2024-10-29
Payer: COMMERCIAL

## 2024-10-29 DIAGNOSIS — K75.4 AUTOIMMUNE HEPATITIS (H): ICD-10-CM

## 2024-10-29 PROCEDURE — 36415 COLL VENOUS BLD VENIPUNCTURE: CPT

## 2024-10-29 PROCEDURE — 80076 HEPATIC FUNCTION PANEL: CPT

## 2024-10-30 LAB
ALBUMIN SERPL BCG-MCNC: 3.6 G/DL (ref 3.5–5.2)
ALP SERPL-CCNC: 79 U/L (ref 40–150)
ALT SERPL W P-5'-P-CCNC: 24 U/L (ref 0–70)
AST SERPL W P-5'-P-CCNC: 34 U/L (ref 0–45)
BILIRUB DIRECT SERPL-MCNC: <0.2 MG/DL (ref 0–0.3)
BILIRUB SERPL-MCNC: 0.5 MG/DL
PROT SERPL-MCNC: 7 G/DL (ref 6.4–8.3)

## 2024-11-04 NOTE — RESULT ENCOUNTER NOTE
These liver tests continue to look good.    If you have any questions about your liver disease care or tests, you can call the clinic at [phone].     - Dr. Decker

## 2024-11-19 ENCOUNTER — LAB (OUTPATIENT)
Dept: LAB | Facility: CLINIC | Age: 79
End: 2024-11-19
Payer: COMMERCIAL

## 2024-11-19 DIAGNOSIS — K75.4 AUTOIMMUNE HEPATITIS (H): ICD-10-CM

## 2024-11-19 LAB
ALBUMIN SERPL BCG-MCNC: 3.7 G/DL (ref 3.5–5.2)
ALP SERPL-CCNC: 83 U/L (ref 40–150)
ALT SERPL W P-5'-P-CCNC: 24 U/L (ref 0–70)
AST SERPL W P-5'-P-CCNC: 37 U/L (ref 0–45)
BILIRUB DIRECT SERPL-MCNC: <0.2 MG/DL (ref 0–0.3)
BILIRUB SERPL-MCNC: 0.5 MG/DL
ERYTHROCYTE [DISTWIDTH] IN BLOOD BY AUTOMATED COUNT: 15.6 % (ref 10–15)
HCT VFR BLD AUTO: 39.3 % (ref 40–53)
HGB BLD-MCNC: 12.2 G/DL (ref 13.3–17.7)
MCH RBC QN AUTO: 27.1 PG (ref 26.5–33)
MCHC RBC AUTO-ENTMCNC: 31 G/DL (ref 31.5–36.5)
MCV RBC AUTO: 87 FL (ref 78–100)
PLATELET # BLD AUTO: 190 10E3/UL (ref 150–450)
PROT SERPL-MCNC: 7.2 G/DL (ref 6.4–8.3)
RBC # BLD AUTO: 4.51 10E6/UL (ref 4.4–5.9)
WBC # BLD AUTO: 5.4 10E3/UL (ref 4–11)

## 2024-11-19 PROCEDURE — 36415 COLL VENOUS BLD VENIPUNCTURE: CPT

## 2024-11-19 PROCEDURE — 80076 HEPATIC FUNCTION PANEL: CPT

## 2024-11-19 PROCEDURE — 85027 COMPLETE CBC AUTOMATED: CPT

## 2024-11-20 DIAGNOSIS — K75.4 AUTOIMMUNE HEPATITIS (H): Primary | ICD-10-CM

## 2024-11-20 DIAGNOSIS — R74.8 ELEVATED LIVER ENZYMES: ICD-10-CM

## 2024-11-20 NOTE — RESULT ENCOUNTER NOTE
Mr. Araya:    These liver tests continue to look good.    If you have any questions about your liver disease care or tests, you can call the clinic at [phone].     - Dr. Decker

## 2024-12-10 ENCOUNTER — LAB (OUTPATIENT)
Dept: LAB | Facility: CLINIC | Age: 79
End: 2024-12-10
Payer: COMMERCIAL

## 2024-12-10 DIAGNOSIS — R74.8 ELEVATED LIVER ENZYMES: ICD-10-CM

## 2024-12-10 DIAGNOSIS — K75.4 AUTOIMMUNE HEPATITIS (H): ICD-10-CM

## 2024-12-10 LAB
ALBUMIN SERPL BCG-MCNC: 3.8 G/DL (ref 3.5–5.2)
ALP SERPL-CCNC: 105 U/L (ref 40–150)
ALT SERPL W P-5'-P-CCNC: 35 U/L (ref 0–70)
AST SERPL W P-5'-P-CCNC: 52 U/L (ref 0–45)
BILIRUB DIRECT SERPL-MCNC: <0.2 MG/DL (ref 0–0.3)
BILIRUB SERPL-MCNC: 0.5 MG/DL
ERYTHROCYTE [DISTWIDTH] IN BLOOD BY AUTOMATED COUNT: 15.4 % (ref 10–15)
FERRITIN SERPL-MCNC: 21 NG/ML (ref 31–409)
HCT VFR BLD AUTO: 39.6 % (ref 40–53)
HGB BLD-MCNC: 12.2 G/DL (ref 13.3–17.7)
IRON BINDING CAPACITY (ROCHE): 328 UG/DL (ref 240–430)
IRON SATN MFR SERPL: 10 % (ref 15–46)
IRON SERPL-MCNC: 34 UG/DL (ref 61–157)
MCH RBC QN AUTO: 26.4 PG (ref 26.5–33)
MCHC RBC AUTO-ENTMCNC: 30.8 G/DL (ref 31.5–36.5)
MCV RBC AUTO: 86 FL (ref 78–100)
PLATELET # BLD AUTO: 207 10E3/UL (ref 150–450)
PROT SERPL-MCNC: 7.3 G/DL (ref 6.4–8.3)
RBC # BLD AUTO: 4.62 10E6/UL (ref 4.4–5.9)
WBC # BLD AUTO: 4.9 10E3/UL (ref 4–11)

## 2024-12-10 PROCEDURE — 80076 HEPATIC FUNCTION PANEL: CPT

## 2024-12-10 PROCEDURE — 85027 COMPLETE CBC AUTOMATED: CPT

## 2024-12-10 PROCEDURE — 82728 ASSAY OF FERRITIN: CPT

## 2024-12-10 PROCEDURE — 83540 ASSAY OF IRON: CPT

## 2024-12-10 PROCEDURE — 83550 IRON BINDING TEST: CPT

## 2024-12-10 PROCEDURE — 36415 COLL VENOUS BLD VENIPUNCTURE: CPT

## 2024-12-11 ENCOUNTER — TELEPHONE (OUTPATIENT)
Dept: GASTROENTEROLOGY | Facility: CLINIC | Age: 79
End: 2024-12-11
Payer: COMMERCIAL

## 2024-12-11 DIAGNOSIS — K75.4 AUTOIMMUNE HEPATITIS (H): Primary | ICD-10-CM

## 2024-12-11 DIAGNOSIS — D50.0 IRON DEFICIENCY ANEMIA DUE TO CHRONIC BLOOD LOSS: ICD-10-CM

## 2024-12-11 RX ORDER — AZATHIOPRINE 50 MG/1
50 TABLET ORAL DAILY
Qty: 90 TABLET | Refills: 2 | Status: SHIPPED | OUTPATIENT
Start: 2024-12-11

## 2024-12-11 NOTE — TELEPHONE ENCOUNTER
Prior Authorization Retail Medication Request    Medication/Dose: Azathioprine -  Take 1 tablet (50 mg) by mouth daily  Diagnosis and ICD code (if different than what is on RX):  K75.4   New/renewal/insurance change PA/secondary ins. PA:  Previously Tried and Failed:   Rationale:  first line autoimmune hepatitis treatment    Insurance   Primary:   Insurance ID:      Secondary (if applicable):  Insurance ID:      Pharmacy Information (if different than what is on RX)  Name:    Phone:    Fax:    Clinic Information  Preferred routing pool for dept communication:

## 2024-12-11 NOTE — TELEPHONE ENCOUNTER
Retail Pharmacy Prior Authorization Team   Phone: [phone]    Prior Authorization Not Needed per Insurance    Medication: AZATHIOPRINE 50 MG PO TABS  Insurance Company: [employer] Non-Specialty PA's - Phone [phone] Fax [phone]  Expected CoPay: $    Pharmacy Filling the Rx: [facility] - [address]  Pharmacy Notified: YES  Patient Notified: YES(faxed notification to pharmacy and they will send to patient when ready)

## 2024-12-11 NOTE — RESULT ENCOUNTER NOTE
Mr. Araya:    Your hemoglobin has been mildly low, and your recent studies show some degree of iron deficiency. In patients who have iron deficiency anemia, we recommend a colonoscopy and upper GI endoscopy to look for polyps and other abnormalities that can lead to GI blood loss.    I have therefore put in orders for you to be scheduled for these procedures (colonoscopy and endoscopy). I will request that I do these procedures, so that we can also discuss your liver issues at that time.    I am also suggesting that you increase your Azathioprine dose to 100 mg daily. We will write a new prescription to your pharmacy. I suggest that you have liver tests and CBC monthly for now.    If you have any questions about your liver disease care or tests, you can call the clinic at [phone].     - Dr. Decker

## 2024-12-12 ENCOUNTER — TELEPHONE (OUTPATIENT)
Dept: GASTROENTEROLOGY | Facility: CLINIC | Age: 79
End: 2024-12-12
Payer: COMMERCIAL

## 2024-12-12 NOTE — TELEPHONE ENCOUNTER
"Endoscopy Scheduling Screen    Have you had any respiratory illness or flu-like symptoms in the last 10 days?  No    What is your communication preference for Instructions and/or Bowel Prep?   MyChart    What insurance is in the chart?  Other:  [employer]    Ordering/Referring Provider: SYLWIA CORNEJO   (If ordering provider performs procedure, schedule with ordering provider unless otherwise instructed. )    BMI: Estimated body mass index is 28.55 kg/m  as calculated from the following:    Height as of 9/3/24: 1.778 m (5' 10\").    Weight as of 10/8/24: 90.3 kg (199 lb).     Sedation Ordered  moderate sedation.   If patient BMI > 50 do not schedule in [facility].    If patient BMI > 45 do not schedule at [facility].    Are you taking methadone or Suboxone?  NO, No RN review required.    Have you been diagnosed and are being treated for severe PTSD or severe anxiety?  NO, No RN review required.    Are you taking any prescription medications for pain 3 or more times per week?   NO, No RN review required.    Do you have a history of malignant hyperthermia?  No    (Females) Are you currently pregnant?   No     Have you been diagnosed or told you have pulmonary hypertension?   No    Do you have an LVAD?  No    Have you been told you have moderate to severe sleep apnea?  No.    Have you been told you have COPD, asthma, or any other lung disease?  No    Do you have any heart conditions?  No     Have you ever had or are you waiting for an organ transplant?  No. Continue scheduling, no site restrictions.    Have you had a stroke or transient ischemic attack (TIA aka \"mini stroke\" in the last 6 months?   No    Have you been diagnosed with or been told you have cirrhosis of the liver?   No.    Are you currently on dialysis?   No    Do you need assistance transferring?   No    BMI: Estimated body mass index is 28.55 kg/m  as calculated from the following:    Height as of 9/3/24: 1.778 m (5' 10\").    Weight as of 10/8/24: 90.3 kg (199 lb). "     Is patients BMI > 40 and scheduling location [facility]?  No    Do you take an injectable or oral medication for weight loss or diabetes (excluding insulin)?  No    Do you take the medication Naltrexone?  No    Do you take blood thinners?  No       Prep   Are you currently on dialysis or do you have chronic kidney disease?  No    Do you have a diagnosis of diabetes?  Yes (Golytely Prep)    Do you have a diagnosis of cystic fibrosis (CF)?  No    On a regular basis do you go 3 -5 days between bowel movements?  No    BMI > 40?  No    Preferred Pharmacy:        [id number] [place], [place] - [address]  [address] [facility] [address]  Phone: [phone] Fax: [phone]      Final Scheduling Details     Procedure scheduled  Colonoscopy / Upper endoscopy (EGD)    Surgeon:  CLEMENTE     Date of procedure:  3/3/25     Pre-OP / PAC:   No - Not required for this site.    Location  [facility] - Per order.    Sedation   Moderate Sedation - Per order.      Patient Reminders:   You will receive a call from a Nurse to review instructions and health history.  This assessment must be completed prior to your procedure.  Failure to complete the Nurse assessment may result in the procedure being cancelled.      On the day of your procedure, please designate an adult(s) who can drive you home stay with you for the next 24 hours. The medicines used in the exam will make you sleepy. You will not be able to drive.      You cannot take public transportation, ride share services, or non-medical taxi service without a responsible caregiver.  Medical transport services are allowed with the requirement that a responsible caregiver will receive you at your destination.  We require that drivers and caregivers are confirmed prior to your procedure.

## 2024-12-28 ENCOUNTER — HEALTH MAINTENANCE LETTER (OUTPATIENT)
Age: 79
End: 2024-12-28

## 2025-01-06 DIAGNOSIS — K75.4 AUTOIMMUNE HEPATITIS (H): ICD-10-CM

## 2025-01-06 RX ORDER — AZATHIOPRINE 50 MG/1
100 TABLET ORAL DAILY
Qty: 180 TABLET | Refills: 2 | Status: SHIPPED | OUTPATIENT
Start: 2025-01-06

## 2025-01-14 ENCOUNTER — LAB (OUTPATIENT)
Dept: LAB | Facility: CLINIC | Age: 80
End: 2025-01-14
Payer: COMMERCIAL

## 2025-01-14 DIAGNOSIS — K75.4 AUTOIMMUNE HEPATITIS (H): ICD-10-CM

## 2025-01-14 LAB
ERYTHROCYTE [DISTWIDTH] IN BLOOD BY AUTOMATED COUNT: 16.4 % (ref 10–15)
HCT VFR BLD AUTO: 40.3 % (ref 40–53)
HGB BLD-MCNC: 12.3 G/DL (ref 13.3–17.7)
MCH RBC QN AUTO: 25.9 PG (ref 26.5–33)
MCHC RBC AUTO-ENTMCNC: 30.5 G/DL (ref 31.5–36.5)
MCV RBC AUTO: 85 FL (ref 78–100)
PLATELET # BLD AUTO: 205 10E3/UL (ref 150–450)
RBC # BLD AUTO: 4.74 10E6/UL (ref 4.4–5.9)
WBC # BLD AUTO: 4.9 10E3/UL (ref 4–11)

## 2025-01-14 PROCEDURE — 80053 COMPREHEN METABOLIC PANEL: CPT

## 2025-01-14 PROCEDURE — 85027 COMPLETE CBC AUTOMATED: CPT

## 2025-01-14 PROCEDURE — 86364 TISS TRNSGLTMNASE EA IG CLAS: CPT

## 2025-01-14 PROCEDURE — 82784 ASSAY IGA/IGD/IGG/IGM EACH: CPT

## 2025-01-14 PROCEDURE — 36415 COLL VENOUS BLD VENIPUNCTURE: CPT

## 2025-01-15 LAB
ALBUMIN SERPL BCG-MCNC: 4.1 G/DL (ref 3.5–5.2)
ALP SERPL-CCNC: 119 U/L (ref 40–150)
ALT SERPL W P-5'-P-CCNC: 37 U/L (ref 0–70)
ANION GAP SERPL CALCULATED.3IONS-SCNC: 9 MMOL/L (ref 7–15)
AST SERPL W P-5'-P-CCNC: 56 U/L (ref 0–45)
BILIRUB SERPL-MCNC: 0.6 MG/DL
BUN SERPL-MCNC: 21.2 MG/DL (ref 8–23)
CALCIUM SERPL-MCNC: 9.1 MG/DL (ref 8.8–10.4)
CHLORIDE SERPL-SCNC: 107 MMOL/L (ref 98–107)
CREAT SERPL-MCNC: 1.22 MG/DL (ref 0.67–1.17)
EGFRCR SERPLBLD CKD-EPI 2021: 61 ML/MIN/1.73M2
GLUCOSE SERPL-MCNC: 90 MG/DL (ref 70–99)
HCO3 SERPL-SCNC: 26 MMOL/L (ref 22–29)
IGG SERPL-MCNC: 1540 MG/DL (ref 610–1616)
POTASSIUM SERPL-SCNC: 4.1 MMOL/L (ref 3.4–5.3)
PROT SERPL-MCNC: 7.7 G/DL (ref 6.4–8.3)
SODIUM SERPL-SCNC: 142 MMOL/L (ref 135–145)
TTG IGA SER-ACNC: 5 U/ML
TTG IGG SER-ACNC: 1.1 U/ML

## 2025-02-17 ENCOUNTER — TELEPHONE (OUTPATIENT)
Dept: GASTROENTEROLOGY | Facility: CLINIC | Age: 80
End: 2025-02-17
Payer: COMMERCIAL

## 2025-02-17 DIAGNOSIS — D50.9 ANEMIA, IRON DEFICIENCY: Primary | ICD-10-CM

## 2025-02-17 NOTE — TELEPHONE ENCOUNTER
Attempted to contact patient in order to complete pre assessment questions.     No answer. Left message to return call to [phone] option 3.    Callback communication sent via [employer].    Kelsie Steele RN    --------------------------------------------------------------------------------------------------------------------    Pre visit planning completed.      Procedure details:    Patient scheduled for Colonoscopy/Upper endoscopy (EGD) on 3/3/25.     Arrival time: 0730. Procedure time 0830    Facility location: [facility]; [address] [place] [address], [address] Floor, [place] [address]. Check in location: Main entrance at registration desk.  *Disclaimer: Drivers are to check in with patient and stay on campus during procedure.     Sedation type: Conscious sedation     Pre op exam needed? No.    Indication for procedure: Iron deficiency anemia       Chart review:     Electronic implanted devices? Yes: Pacemaker    Recent diagnosis of diverticulitis within the last 6 weeks? No      Medication review:    Diabetic? Yes. Not on diabetic medication    Anticoagulants? Yes Enoxaparin (Lovenox): Recommended HOLD 24 hours before procedure.  Consult with your managing provider.    Weight loss medication/injectable? No GLP-1 medication per patient's medication list. Nursing to verify with pre-assessment call.    Other medication HOLDING recommendations:  N/A      Prep for procedure:     Bowel prep recommendation: Verify bowel habits. Per chart review, patient was prescribed miralax, docusate sodium in April 2024. It appears it may have been prescribed while patient was on oxycodone. Please verify if patient is still taking oxycodone and bowel habits. Thank you. Per 12/12/24 TE, patient denies going 3-5 days without bowel movement.    Due to: standard bowel prep    Procedure information and instructions sent via [employer]         Kelsie Steele RN  Endoscopy Procedure Pre Assessment   [phone] option 3

## 2025-02-20 RX ORDER — BISACODYL 5 MG/1
TABLET, DELAYED RELEASE ORAL
Qty: 4 TABLET | Refills: 0 | Status: SHIPPED | OUTPATIENT
Start: 2025-02-20

## 2025-02-20 NOTE — TELEPHONE ENCOUNTER
Pre assessment completed for upcoming procedure.   (Please see previous telephone encounter notes for complete details)    Patient returned call.       Procedure details:    Arrival time and facility location reviewed.    Pre op exam needed? No.    Designated  policy reviewed and that site requests drivers to check in and stay on campus. Instructed to have someone stay 6  hours post procedure.   *Disclaimer - please notify the [facility] Op Manager with any  issues/concerns.    Medication review:    Medications reviewed. Please see supporting documentation below. Holding recommendations discussed (if applicable).     Not taking Lovenox at this time, has been off since April.       Prep for procedure:    Procedure prep instructions reviewed.      Denies constipation or chronic pain medication usage. Will switch to standard Golytely bowel prep as the Pt does have DM. Script and Mychart sent.       Any additional information needed:  The Pt does not have a pacemaker. The Pt discussed this with RN for nearly 15 minutes. The Pt had a lot of questions and concerns about this. Advised him this was likely a typo.       Patient verbalized understanding and had no questions or concerns at this time.      Ines Moreira RN  Endoscopy Procedure Pre Assessment   [phone] option 3

## 2025-02-20 NOTE — TELEPHONE ENCOUNTER
Second call attempt to complete pre assessment.     No answer. Left message to return call to [phone] #3 by next business day prior to 4PM.    Callback communication sent via [email or web address].      Joselyn Oliver RN  Endoscopy Procedure Pre Assessment

## 2025-03-03 ENCOUNTER — ANESTHESIA (OUTPATIENT)
Dept: GASTROENTEROLOGY | Facility: CLINIC | Age: 80
End: 2025-03-03
Payer: COMMERCIAL

## 2025-03-03 ENCOUNTER — HOSPITAL ENCOUNTER (OUTPATIENT)
Facility: CLINIC | Age: 80
Discharge: HOME | End: 2025-03-03
Attending: INTERNAL MEDICINE | Admitting: INTERNAL MEDICINE
Payer: COMMERCIAL

## 2025-03-03 ENCOUNTER — ANESTHESIA EVENT (OUTPATIENT)
Dept: GASTROENTEROLOGY | Facility: CLINIC | Age: 80
End: 2025-03-03
Payer: COMMERCIAL

## 2025-03-03 VITALS
DIASTOLIC BLOOD PRESSURE: 86 MMHG | HEART RATE: 78 BPM | OXYGEN SATURATION: 96 % | SYSTOLIC BLOOD PRESSURE: 115 MMHG | RESPIRATION RATE: 11 BRPM

## 2025-03-03 DIAGNOSIS — K75.4 AUTOIMMUNE HEPATITIS (H): Primary | ICD-10-CM

## 2025-03-03 DIAGNOSIS — E61.1 IRON DEFICIENCY: ICD-10-CM

## 2025-03-03 LAB
COLONOSCOPY: NORMAL
UPPER GI ENDOSCOPY: NORMAL

## 2025-03-03 PROCEDURE — G0500 MOD SEDAT ENDO SERVICE >5YRS: HCPCS | Performed by: INTERNAL MEDICINE

## 2025-03-03 PROCEDURE — 43239 EGD BIOPSY SINGLE/MULTIPLE: CPT | Performed by: INTERNAL MEDICINE

## 2025-03-03 PROCEDURE — 250N000011 HC RX IP 250 OP 636: Performed by: INTERNAL MEDICINE

## 2025-03-03 PROCEDURE — 88342 IMHCHEM/IMCYTCHM 1ST ANTB: CPT | Mod: TC | Performed by: INTERNAL MEDICINE

## 2025-03-03 PROCEDURE — 45330 DIAGNOSTIC SIGMOIDOSCOPY: CPT

## 2025-03-03 PROCEDURE — 250N000009 HC RX 250: Performed by: INTERNAL MEDICINE

## 2025-03-03 RX ORDER — ONDANSETRON 2 MG/ML
4 INJECTION INTRAMUSCULAR; INTRAVENOUS EVERY 30 MIN PRN
Status: CANCELLED | OUTPATIENT
Start: 2025-03-03

## 2025-03-03 RX ORDER — HYDROMORPHONE HCL IN WATER/PF 6 MG/30 ML
0.4 PATIENT CONTROLLED ANALGESIA SYRINGE INTRAVENOUS EVERY 5 MIN PRN
Status: CANCELLED | OUTPATIENT
Start: 2025-03-03

## 2025-03-03 RX ORDER — LIDOCAINE 40 MG/G
CREAM TOPICAL
Status: DISCONTINUED | OUTPATIENT
Start: 2025-03-03 | End: 2025-03-03 | Stop reason: HOSPADM

## 2025-03-03 RX ORDER — ONDANSETRON 4 MG/1
4 TABLET, ORALLY DISINTEGRATING ORAL EVERY 30 MIN PRN
Status: CANCELLED | OUTPATIENT
Start: 2025-03-03

## 2025-03-03 RX ORDER — FENTANYL CITRATE 50 UG/ML
25 INJECTION, SOLUTION INTRAMUSCULAR; INTRAVENOUS EVERY 5 MIN PRN
Status: CANCELLED | OUTPATIENT
Start: 2025-03-03

## 2025-03-03 RX ORDER — OXYCODONE HYDROCHLORIDE 5 MG/1
5 TABLET ORAL
Status: CANCELLED | OUTPATIENT
Start: 2025-03-03

## 2025-03-03 RX ORDER — NALOXONE HYDROCHLORIDE 0.4 MG/ML
0.1 INJECTION, SOLUTION INTRAMUSCULAR; INTRAVENOUS; SUBCUTANEOUS
Status: CANCELLED | OUTPATIENT
Start: 2025-03-03

## 2025-03-03 RX ORDER — DEXAMETHASONE SODIUM PHOSPHATE 4 MG/ML
4 INJECTION, SOLUTION INTRA-ARTICULAR; INTRALESIONAL; INTRAMUSCULAR; INTRAVENOUS; SOFT TISSUE
Status: CANCELLED | OUTPATIENT
Start: 2025-03-03

## 2025-03-03 RX ORDER — OXYCODONE HYDROCHLORIDE 10 MG/1
10 TABLET ORAL
Status: CANCELLED | OUTPATIENT
Start: 2025-03-03

## 2025-03-03 RX ORDER — FENTANYL CITRATE 50 UG/ML
INJECTION, SOLUTION INTRAMUSCULAR; INTRAVENOUS PRN
Status: DISCONTINUED | OUTPATIENT
Start: 2025-03-03 | End: 2025-03-03 | Stop reason: HOSPADM

## 2025-03-03 RX ORDER — ONDANSETRON 4 MG/1
4 TABLET, ORALLY DISINTEGRATING ORAL EVERY 6 HOURS PRN
Status: DISCONTINUED | OUTPATIENT
Start: 2025-03-03 | End: 2025-03-03 | Stop reason: HOSPADM

## 2025-03-03 RX ORDER — SODIUM CHLORIDE, SODIUM LACTATE, POTASSIUM CHLORIDE, CALCIUM CHLORIDE 600; 310; 30; 20 MG/100ML; MG/100ML; MG/100ML; MG/100ML
INJECTION, SOLUTION INTRAVENOUS CONTINUOUS
Status: CANCELLED | OUTPATIENT
Start: 2025-03-03

## 2025-03-03 RX ORDER — NALOXONE HYDROCHLORIDE 0.4 MG/ML
0.4 INJECTION, SOLUTION INTRAMUSCULAR; INTRAVENOUS; SUBCUTANEOUS
Status: DISCONTINUED | OUTPATIENT
Start: 2025-03-03 | End: 2025-03-03 | Stop reason: HOSPADM

## 2025-03-03 RX ORDER — FENTANYL CITRATE 50 UG/ML
50 INJECTION, SOLUTION INTRAMUSCULAR; INTRAVENOUS EVERY 5 MIN PRN
Status: CANCELLED | OUTPATIENT
Start: 2025-03-03

## 2025-03-03 RX ORDER — HYDROMORPHONE HCL IN WATER/PF 6 MG/30 ML
0.2 PATIENT CONTROLLED ANALGESIA SYRINGE INTRAVENOUS EVERY 5 MIN PRN
Status: CANCELLED | OUTPATIENT
Start: 2025-03-03

## 2025-03-03 RX ORDER — LABETALOL HYDROCHLORIDE 5 MG/ML
10 INJECTION, SOLUTION INTRAVENOUS
Status: CANCELLED | OUTPATIENT
Start: 2025-03-03

## 2025-03-03 RX ORDER — NALOXONE HYDROCHLORIDE 0.4 MG/ML
0.2 INJECTION, SOLUTION INTRAMUSCULAR; INTRAVENOUS; SUBCUTANEOUS
Status: DISCONTINUED | OUTPATIENT
Start: 2025-03-03 | End: 2025-03-03 | Stop reason: HOSPADM

## 2025-03-03 RX ORDER — FLUMAZENIL 0.1 MG/ML
0.2 INJECTION, SOLUTION INTRAVENOUS
Status: DISCONTINUED | OUTPATIENT
Start: 2025-03-03 | End: 2025-03-03 | Stop reason: HOSPADM

## 2025-03-03 RX ORDER — PROCHLORPERAZINE MALEATE 5 MG/1
5 TABLET ORAL EVERY 6 HOURS PRN
Status: DISCONTINUED | OUTPATIENT
Start: 2025-03-03 | End: 2025-03-03 | Stop reason: HOSPADM

## 2025-03-03 RX ORDER — ONDANSETRON 2 MG/ML
4 INJECTION INTRAMUSCULAR; INTRAVENOUS EVERY 6 HOURS PRN
Status: DISCONTINUED | OUTPATIENT
Start: 2025-03-03 | End: 2025-03-03 | Stop reason: HOSPADM

## 2025-03-03 RX ORDER — ONDANSETRON 2 MG/ML
4 INJECTION INTRAMUSCULAR; INTRAVENOUS
Status: DISCONTINUED | OUTPATIENT
Start: 2025-03-03 | End: 2025-03-03 | Stop reason: HOSPADM

## 2025-03-03 ASSESSMENT — ACTIVITIES OF DAILY LIVING (ADL)
ADLS_ACUITY_SCORE: 41

## 2025-03-03 NOTE — OR NURSING
EGD completed under moderate sedation. Biopsies sent to pathology. Pt tolerated procedure. Colonoscopy attempted, but incomplete d/t poor bowel prep.

## 2025-03-03 NOTE — H&P
ENDOSCOPY PRE-SEDATION H&P FOR OUTPATIENT PROCEDURES    Haim Araya  [id number]  1945    Procedure: Colonoscopy Endoscopy    Pre-procedure diagnosis: JOSSELINE    Past medical history: No past medical history on file.  Patient Active Problem List   Diagnosis    Cerebral infarction (H)    DM (diabetes mellitus), type 2 (H)    Dyslipidemia    GERD (gastroesophageal reflux disease)    HTN (hypertension)    SDH (subdural hematoma) (H)    Autoimmune hepatitis (H)       Past surgical history:   Past Surgical History:   Procedure Laterality Date    IR LIVER BIOPSY PERCUTANEOUS  8/20/2024    PERCUTANEOUS BIOPSY LIVER Right 8/20/2024    Procedure: Percutaneous biopsy liver;  Surgeon: Fareed Adams MD;  Location: [facility] OR       Current Facility-Administered Medications   Medication Dose Route Frequency Provider Last Rate Last Admin    flumazenil (ROMAZICON) injection 0.2 mg  0.2 mg Intravenous q1 min prn Hamlet Decker MD        naloxone (NARCAN) injection 0.2 mg  0.2 mg Intravenous Q2 Min PRN Hamlet Decker MD        naloxone (NARCAN) injection 0.2 mg  0.2 mg Intramuscular Q2 Min PRN Hamlet Decker MD        naloxone (NARCAN) injection 0.4 mg  0.4 mg Intravenous Q2 Min PRN Hamlet Decker MD        naloxone (NARCAN) injection 0.4 mg  0.4 mg Intramuscular Q2 Min PRN Hamlet Decker MD        ondansetron (ZOFRAN ODT) ODT tab 4 mg  4 mg Oral Q6H PRN Hamlet Decker MD        Or    ondansetron (ZOFRAN) injection 4 mg  4 mg Intravenous Q6H PRN Hamlet Decker MD        prochlorperazine (COMPAZINE) injection 5 mg  5 mg Intravenous Q6H PRN Hamlet Decker MD        Or    prochlorperazine (COMPAZINE) tablet 5 mg  5 mg Oral Q6H PRN Hamlet Decker MD           Allergies   Allergen Reactions    Adhesive Tape Other (See Comments) and Rash     Holter monitor  electrodes    Aluminum Chloride Other (See Comments)     Antiperspirant ingreadiant      Erythema; Antiperspirant ingreadiant       Physical Exam:    Mental status: alert  Heart: Normal  Lung: Normal  Assessment of patient's airway: Normal  Other as pertinent for procedure: None     Lab Results   Component Value Date    WBC 5.4 02/14/2025     Lab Results   Component Value Date    RBC 4.67 02/14/2025     Lab Results   Component Value Date    HGB 12.3 02/14/2025     Lab Results   Component Value Date    HCT 39.9 02/14/2025     Lab Results   Component Value Date    MCV 85 02/14/2025     Lab Results   Component Value Date    MCH 26.3 02/14/2025     Lab Results   Component Value Date    MCHC 30.8 02/14/2025     Lab Results   Component Value Date    RDW 17.6 02/14/2025     Lab Results   Component Value Date     02/14/2025     INR   Date Value Ref Range Status   08/01/2024 1.15 0.85 - 1.15 Final        ASA Score: See Provation note    Mallampati score:  I - Faucial pillars, soft palate, and uvula are visible    Assessment/Plan:     The patient is an appropriate candidate to receive sedation.    Informed consent was discussed with the patient/family, including the risks, benefits, potential complications and any alternative options associated with sedation.    Patient assessment completed just prior to sedation and while under constant observation by the provider. Condition determined to be adequate for proceeding with sedation.    The specific risks for the procedure were discussed with the patient at the time of informed consent and include but are not limited to perforation which could require surgery, missing significant neoplasm or lesion, hemorrhage and adverse sedative complication.      Hamlet Decker MD

## 2025-03-05 LAB
PATH REPORT.ADDENDUM SPEC: NORMAL
PATH REPORT.COMMENTS IMP SPEC: NORMAL
PATH REPORT.FINAL DX SPEC: NORMAL
PATH REPORT.GROSS SPEC: NORMAL
PATH REPORT.MICROSCOPIC SPEC OTHER STN: NORMAL
PATH REPORT.RELEVANT HX SPEC: NORMAL
PHOTO IMAGE: NORMAL

## 2025-03-23 ENCOUNTER — HEALTH MAINTENANCE LETTER (OUTPATIENT)
Age: 80
End: 2025-03-23

## 2025-03-27 ENCOUNTER — TELEPHONE (OUTPATIENT)
Dept: GASTROENTEROLOGY | Facility: CLINIC | Age: 80
End: 2025-03-27
Payer: COMMERCIAL

## 2025-03-27 NOTE — TELEPHONE ENCOUNTER
"Endoscopy Scheduling Screen    Have you had any respiratory illness or flu-like symptoms in the last 10 days?  No    What is your communication preference for Instructions and/or Bowel Prep?   MyChart    What insurance is in the chart?  Other:  [employer]    Ordering/Referring Provider:   SYLWIA CORNEJO    (If ordering provider performs procedure, schedule with ordering provider unless otherwise instructed. )    BMI: Estimated body mass index is 28.55 kg/m  as calculated from the following:    Height as of 9/3/24: 1.778 m (5' 10\").    Weight as of 10/8/24: 90.3 kg (199 lb).     Sedation Ordered  moderate sedation.   If patient BMI > 50 do not schedule in [facility].    If patient BMI > 45 do not schedule at [facility].    Are you taking methadone or Suboxone?  NO, No RN review required.    Have you been diagnosed and are being treated for severe PTSD or severe anxiety?  NO, No RN review required.    Are you taking any prescription medications for pain 3 or more times per week?   NO, No RN review required.    Do you have a history of malignant hyperthermia?  No    (Females) Are you currently pregnant?   No     Have you been diagnosed or told you have pulmonary hypertension?   No    Do you have an LVAD?  No    Have you been told you have moderate to severe sleep apnea?  No.    Have you been told you have COPD, asthma, or any other lung disease?  No    Has your doctor ordered any cardiac tests like echo, angiogram, stress test, ablation, or EKG, that you have not completed yet?  No    Do you  have a history of any heart conditions?  No     Have you ever had or are you waiting for an organ transplant?  No. Continue scheduling, no site restrictions.    Have you had a stroke or transient ischemic attack (TIA aka \"mini stroke\") in the last 2 years?   No.    Have you been diagnosed with or been told you have cirrhosis of the liver?   No.    Are you currently on dialysis?   No    Do you need assistance transferring?   No    BMI: " "Estimated body mass index is 28.55 kg/m  as calculated from the following:    Height as of 9/3/24: 1.778 m (5' 10\").    Weight as of 10/8/24: 90.3 kg (199 lb).     Is patients BMI > 40 and scheduling location [facility]?  No    Do you take an injectable or oral medication for weight loss or diabetes (excluding insulin)?  No    Do you take the medication Naltrexone?  No    Do you take blood thinners?  No       Prep   Are you currently on dialysis or do you have chronic kidney disease?  No    Do you have a diagnosis of diabetes?  Yes (Golytely Prep)    Do you have a diagnosis of cystic fibrosis (CF)?  No    On a regular basis do you go 3 -5 days between bowel movements?  No    BMI > 40?  No    Preferred Pharmacy:    [facility] [address] [address]  Phone: [phone] Fax: [phone]    [facility] [place], [place] - [address]  [address] [facility] [address]  Phone: [phone] Fax: [phone]      Final Scheduling Details     Procedure scheduled  Colonoscopy    Surgeon:  SYLWIA CORNEJO       Date of procedure:  5/12     Pre-OP / PAC:   No - Not required for this site.    Location  [facility] - Per order.    Sedation   Moderate Sedation - Per order.      Patient Reminders:   You will receive a call from a Nurse to review instructions and health history.  This assessment must be completed prior to your procedure.  Failure to complete the Nurse assessment may result in the procedure being cancelled.      On the day of your procedure, please designate an adult(s) who can drive you home stay with you for the next 24 hours. The medicines used in the exam will make you sleepy. You will not be able to drive.      You cannot take public transportation, ride share services, or non-medical taxi service without a responsible caregiver.  Medical transport services are allowed with the requirement that a responsible caregiver will " receive you at your destination.  We require that drivers and caregivers are confirmed prior to your procedure.

## 2025-04-13 ENCOUNTER — HEALTH MAINTENANCE LETTER (OUTPATIENT)
Age: 80
End: 2025-04-13

## 2025-04-14 ENCOUNTER — LAB (OUTPATIENT)
Dept: LAB | Facility: CLINIC | Age: 80
End: 2025-04-14
Payer: COMMERCIAL

## 2025-04-14 DIAGNOSIS — K75.4 AUTOIMMUNE HEPATITIS (H): ICD-10-CM

## 2025-04-14 LAB
ALBUMIN SERPL BCG-MCNC: 3.9 G/DL (ref 3.5–5.2)
ALP SERPL-CCNC: 90 U/L (ref 40–150)
ALT SERPL W P-5'-P-CCNC: 23 U/L (ref 0–70)
AST SERPL W P-5'-P-CCNC: 37 U/L (ref 0–45)
BILIRUB DIRECT SERPL-MCNC: 0.29 MG/DL (ref 0–0.3)
BILIRUB SERPL-MCNC: 0.7 MG/DL
ERYTHROCYTE [DISTWIDTH] IN BLOOD BY AUTOMATED COUNT: 17.9 % (ref 10–15)
HCT VFR BLD AUTO: 38 % (ref 40–53)
HGB BLD-MCNC: 12 G/DL (ref 13.3–17.7)
MCH RBC QN AUTO: 27.5 PG (ref 26.5–33)
MCHC RBC AUTO-ENTMCNC: 31.6 G/DL (ref 31.5–36.5)
MCV RBC AUTO: 87 FL (ref 78–100)
PLATELET # BLD AUTO: 170 10E3/UL (ref 150–450)
PROT SERPL-MCNC: 7.2 G/DL (ref 6.4–8.3)
RBC # BLD AUTO: 4.37 10E6/UL (ref 4.4–5.9)
WBC # BLD AUTO: 4.9 10E3/UL (ref 4–11)

## 2025-04-14 PROCEDURE — 80076 HEPATIC FUNCTION PANEL: CPT

## 2025-04-14 PROCEDURE — 36415 COLL VENOUS BLD VENIPUNCTURE: CPT

## 2025-04-14 PROCEDURE — 85027 COMPLETE CBC AUTOMATED: CPT

## 2025-04-24 ENCOUNTER — TELEPHONE (OUTPATIENT)
Dept: GASTROENTEROLOGY | Facility: CLINIC | Age: 80
End: 2025-04-24

## 2025-04-24 ENCOUNTER — OFFICE VISIT (OUTPATIENT)
Dept: GASTROENTEROLOGY | Facility: CLINIC | Age: 80
End: 2025-04-24
Attending: INTERNAL MEDICINE
Payer: COMMERCIAL

## 2025-04-24 VITALS
WEIGHT: 202.5 LBS | TEMPERATURE: 97.5 F | HEART RATE: 71 BPM | DIASTOLIC BLOOD PRESSURE: 87 MMHG | BODY MASS INDEX: 29.06 KG/M2 | SYSTOLIC BLOOD PRESSURE: 138 MMHG | OXYGEN SATURATION: 97 %

## 2025-04-24 DIAGNOSIS — K75.4 AUTOIMMUNE HEPATITIS (H): ICD-10-CM

## 2025-04-24 PROCEDURE — G0463 HOSPITAL OUTPT CLINIC VISIT: HCPCS | Performed by: INTERNAL MEDICINE

## 2025-04-24 RX ORDER — AZATHIOPRINE 50 MG/1
100 TABLET ORAL DAILY
Qty: 180 TABLET | Refills: 2 | Status: SHIPPED | OUTPATIENT
Start: 2025-04-24

## 2025-04-24 NOTE — TELEPHONE ENCOUNTER
----- Message from Hamlet Decker sent at 4/24/2025  1:45 PM CDT -----  Regarding: AZA, labs  Hi Rupali:    Could you please renew his azathioprine (100 mg daily) for another year?  Also, please get a liver panel and CBC every 3 months.  We will plan on seeing him back in clinic in 1 year.    TAWANNA Quinn

## 2025-04-24 NOTE — PATIENT INSTRUCTIONS
Summary:    1.  As we discussed, your liver tests remain normal on azathioprine.  Please continue to take this medicine (100 mg daily).  Please contact the clinic at the number below if you need refills.    2.  I am recommending that we repeat your liver panel and CBC every 3 months.  For now, I think we can see you back in 1 year, but you can return sooner if any new liver issues arise.  In the future, we we will consider tapering the azathioprine if your liver tests remain normal.    If you have any questions about your liver disease care or tests, you can call the clinic at [phone].

## 2025-04-24 NOTE — PROGRESS NOTES
[facility]       Hepatology Clinic    Date of Service: 4/24/2025       Primary Care Provider: Segundo Corona    History of Present Illness     Mr. Araya presents for follow-up of presumed autoimmune hepatitis.  He was diagnosed in August 2024 with elevated liver enzymes, a strongly positive smooth muscle antibody, and a compatible liver biopsy.    His liver tests have been normal for some time.  He is currently taking azathioprine 100 mg daily.  He reports no constitutional or GI symptoms.    Past Medical History:  No past medical history on file.    Patient Active Problem List   Diagnosis    Cerebral infarction (H)    DM (diabetes mellitus), type 2 (H)    Dyslipidemia    GERD (gastroesophageal reflux disease)    HTN (hypertension)    SDH (subdural hematoma) (H)    Autoimmune hepatitis (H)       Surgical History:  Past Surgical History:   Procedure Laterality Date    ATTEMPTED ENDOSCOPY N/A 3/3/2025    Procedure: Colonoscopy;  Surgeon: Hamlet Decker MD;  Location: [facility]    ESOPHAGOSCOPY, GASTROSCOPY, DUODENOSCOPY (EGD), COMBINED N/A 3/3/2025    Procedure: Esophagoscopy, gastroscopy, duodenoscopy (EGD), combined;  Surgeon: Hamlet Decker MD;  Location: [facility]    IR LIVER BIOPSY PERCUTANEOUS  8/20/2024    PERCUTANEOUS BIOPSY LIVER Right 8/20/2024    Procedure: Percutaneous biopsy liver;  Surgeon: Fareed Adams MD;  [place] [facility] [place]       Social History:  Social History     Tobacco Use    Smoking status: Never    Smokeless tobacco: Never   Substance Use Topics    Alcohol use: Not Currently    Drug use: Not Currently       Family History:  No family history on file.    Medications:  Current Outpatient Medications   Medication Sig Dispense Refill    aspirin (ASA) 325 MG EC tablet Take 325 mg by mouth daily      atorvastatin (LIPITOR) 40 MG tablet Take 1 tablet by mouth daily      azaTHIOprine (IMURAN) 50 MG tablet Take 2 tablets (100 mg) by mouth daily.  180 tablet 2    bisacodyl (DULCOLAX) 5 MG EC tablet Take 2 tablets at 3 pm the day before your procedure. If your procedure is before 11 am, take 2 additional tablets at 11 pm. If your procedure is after 11 am, take 2 additional tablets at 6 am. For additional instructions refer to your colonoscopy prep instructions. 4 tablet 0    metoprolol tartrate (LOPRESSOR) 25 MG tablet Take 1 tablet by mouth 2 times daily      Multiple Vitamins-Minerals (SENIOR MULTIVITAMIN PLUS PO) Take 1 tablet by mouth daily      polyethylene glycol (GOLYTELY) 236 g suspension The night before the exam at 6 pm drink an 8-ounce glass every 15 minutes until the jug is half empty. If you arrive before 11 AM: Drink the other half of the Golytely jug at 11 PM night before procedure. If you arrive after 11 AM: Drink the other half of the Golytely jug at 6 AM day of procedure. For additional instructions refer to your colonoscopy prep instructions. 4000 mL 0     No current facility-administered medications for this visit.         Objective:         There were no vitals filed for this visit.  There is no height or weight on file to calculate BMI.     Physical Exam  Constitutional: Well-developed, well-nourished, in no apparent distress.    Psychiatric: Normal mood and affect. Behavior is normal.      Labs and Diagnostic tests:  Lab Results   Component Value Date     01/14/2025    POTASSIUM 4.1 01/14/2025    CHLORIDE 107 01/14/2025    CO2 26 01/14/2025    BUN 21.2 01/14/2025    CR 1.22 01/14/2025     Lab Results   Component Value Date    BILITOTAL 0.7 04/14/2025    ALT 23 04/14/2025    AST 37 04/14/2025    ALKPHOS 90 04/14/2025     Lab Results   Component Value Date    ALBUMIN 3.9 04/14/2025    PROTTOTAL 7.2 04/14/2025      Lab Results   Component Value Date    WBC 4.9 04/14/2025    HGB 12.0 04/14/2025    MCV 87 04/14/2025     04/14/2025     Lab Results   Component Value Date    INR 1.15 08/01/2024     Previous work-up:   Lab Results  "  Component Value Date    HEPBANG Nonreactive 08/01/2024    HBCAB Nonreactive 08/01/2024    AUSAB 6.61 08/01/2024    HCVAB Nonreactive 08/01/2024    WAN 21 (L) 12/10/2024    IRONSAT 10 (L) 12/10/2024    TTG 5.0 01/14/2025    TTGG 1.1 01/14/2025    SMOOTHMUS >100 (H) 08/01/2024    MITM2 3.2 08/01/2024      No results found for: \"SPECDES\", \"LDRESULTS\"     LIVER, NEEDLE BIOPSY:  Hepatitis, activity grade 3 /4; no fibrosis; uncertain etiology:   -Etiologic considerations include primary autoimmune versus drug-induced hepatitis    Assessment and Plan:    Likely autoimmune hepatitis.  His initial labs and biopsy was supportive of the diagnosis, and he has responded very well to immunosuppression (initially with prednisone, and now with azathioprine alone).    He had a number of questions that were addressed.  For now, I am suggesting that we maintain him on azathioprine 100 mg daily and see him back in clinic in 1 year.  At that time, we can consider slowly reducing this dose and potentially tapering him off if his liver tests do well.  I am suggesting that he get a CBC and liver panel every 3 months.    He is scheduled for repeat colonoscopy because of his iron deficiency anemia.  He reports no overt GI bleeding.      20 minutes spent with patient, reviewing results, and coordinating care.  The longitudinal plan of care for the diagnosis(es)/condition(s) as documented were addressed during this visit. Due to the added complexity in care, I will continue to support Tom in the subsequent management and with ongoing continuity of care.     This note was created with voice recognition software, and while reviewed for accuracy, typos may remain.        Hamlet Decker MD  Professor of Medicine  [facility]  Division of Gastroenterology, Hepatology, and Nutrition   "

## 2025-04-24 NOTE — LETTER
4/24/2025      Haim Araya  [address]  [place] [address]      Dear Colleague,    Thank you for referring your patient, Haim Araya, to the [facility] [place]. Please see a copy of my visit note below.    [facility]       Hepatology Clinic    Date of Service: 4/24/2025       Primary Care Provider: Segundo Corona    History of Present Illness     Mr. Araya presents for follow-up of presumed autoimmune hepatitis.  He was diagnosed in August 2024 with elevated liver enzymes, a strongly positive smooth muscle antibody, and a compatible liver biopsy.    His liver tests have been normal for some time.  He is currently taking azathioprine 100 mg daily.  He reports no constitutional or GI symptoms.    Past Medical History:  No past medical history on file.    Patient Active Problem List   Diagnosis     Cerebral infarction (H)     DM (diabetes mellitus), type 2 (H)     Dyslipidemia     GERD (gastroesophageal reflux disease)     HTN (hypertension)     SDH (subdural hematoma) (H)     Autoimmune hepatitis (H)       Surgical History:  Past Surgical History:   Procedure Laterality Date     ATTEMPTED ENDOSCOPY N/A 3/3/2025    Procedure: Colonoscopy;  Surgeon: Hamlet Decker MD;  Location: [facility]     ESOPHAGOSCOPY, GASTROSCOPY, DUODENOSCOPY (EGD), COMBINED N/A 3/3/2025    Procedure: Esophagoscopy, gastroscopy, duodenoscopy (EGD), combined;  Surgeon: Hamlet Decker MD;  Location: [facility]     IR LIVER BIOPSY PERCUTANEOUS  8/20/2024     PERCUTANEOUS BIOPSY LIVER Right 8/20/2024    Procedure: Percutaneous biopsy liver;  Surgeon: Fareed Adams MD;  Location: [facility]       Social History:  Social History     Tobacco Use     Smoking status: Never     Smokeless tobacco: Never   Substance Use Topics     Alcohol use: Not Currently     Drug use: Not Currently       Family History:  No family history on file.    Medications:  Current  Outpatient Medications   Medication Sig Dispense Refill     aspirin (ASA) 325 MG EC tablet Take 325 mg by mouth daily       atorvastatin (LIPITOR) 40 MG tablet Take 1 tablet by mouth daily       azaTHIOprine (IMURAN) 50 MG tablet Take 2 tablets (100 mg) by mouth daily. 180 tablet 2     bisacodyl (DULCOLAX) 5 MG EC tablet Take 2 tablets at 3 pm the day before your procedure. If your procedure is before 11 am, take 2 additional tablets at 11 pm. If your procedure is after 11 am, take 2 additional tablets at 6 am. For additional instructions refer to your colonoscopy prep instructions. 4 tablet 0     metoprolol tartrate (LOPRESSOR) 25 MG tablet Take 1 tablet by mouth 2 times daily       Multiple Vitamins-Minerals (SENIOR MULTIVITAMIN PLUS PO) Take 1 tablet by mouth daily       polyethylene glycol (GOLYTELY) 236 g suspension The night before the exam at 6 pm drink an 8-ounce glass every 15 minutes until the jug is half empty. If you arrive before 11 AM: Drink the other half of the Golytely jug at 11 PM night before procedure. If you arrive after 11 AM: Drink the other half of the Golytely jug at 6 AM day of procedure. For additional instructions refer to your colonoscopy prep instructions. 4000 mL 0     No current facility-administered medications for this visit.         Objective:         There were no vitals filed for this visit.  There is no height or weight on file to calculate BMI.     Physical Exam  Constitutional: Well-developed, well-nourished, in no apparent distress.    Psychiatric: Normal mood and affect. Behavior is normal.      Labs and Diagnostic tests:  Lab Results   Component Value Date     01/14/2025    POTASSIUM 4.1 01/14/2025    CHLORIDE 107 01/14/2025    CO2 26 01/14/2025    BUN 21.2 01/14/2025    CR 1.22 01/14/2025     Lab Results   Component Value Date    BILITOTAL 0.7 04/14/2025    ALT 23 04/14/2025    AST 37 04/14/2025    ALKPHOS 90 04/14/2025     Lab Results   Component Value Date     "ALBUMIN 3.9 04/14/2025    PROTTOTAL 7.2 04/14/2025      Lab Results   Component Value Date    WBC 4.9 04/14/2025    HGB 12.0 04/14/2025    MCV 87 04/14/2025     04/14/2025     Lab Results   Component Value Date    INR 1.15 08/01/2024     Previous work-up:   Lab Results   Component Value Date    HEPBANG Nonreactive 08/01/2024    HBCAB Nonreactive 08/01/2024    AUSAB 6.61 08/01/2024    HCVAB Nonreactive 08/01/2024    WAN 21 (L) 12/10/2024    IRONSAT 10 (L) 12/10/2024    TTG 5.0 01/14/2025    TTGG 1.1 01/14/2025    SMOOTHMUS >100 (H) 08/01/2024    MITM2 3.2 08/01/2024      No results found for: \"SPECDES\", \"LDRESULTS\"     LIVER, NEEDLE BIOPSY:  Hepatitis, activity grade 3 /4; no fibrosis; uncertain etiology:   -Etiologic considerations include primary autoimmune versus drug-induced hepatitis    Assessment and Plan:    Likely autoimmune hepatitis.  His initial labs and biopsy was supportive of the diagnosis, and he has responded very well to immunosuppression (initially with prednisone, and now with azathioprine alone).    He had a number of questions that were addressed.  For now, I am suggesting that we maintain him on azathioprine 100 mg daily and see him back in clinic in 1 year.  At that time, we can consider slowly reducing this dose and potentially tapering him off if his liver tests do well.  I am suggesting that he get a CBC and liver panel every 3 months.    He is scheduled for repeat colonoscopy because of his iron deficiency anemia.  He reports no overt GI bleeding.      20 minutes spent with patient, reviewing results, and coordinating care.  The longitudinal plan of care for the diagnosis(es)/condition(s) as documented were addressed during this visit. Due to the added complexity in care, I will continue to support Tom in the subsequent management and with ongoing continuity of care.     This note was created with voice recognition software, and while reviewed for accuracy, typos may " remain.        Hamlet Decker MD  Professor of Medicine  [facility]  Division of Gastroenterology, Hepatology, and Nutrition       Again, thank you for allowing me to participate in the care of your patient.        Sincerely,        Hamlet Decker MD    Electronically signed

## 2025-05-12 ENCOUNTER — HOSPITAL ENCOUNTER (OUTPATIENT)
Facility: CLINIC | Age: 80
Discharge: HOME | End: 2025-05-12
Attending: INTERNAL MEDICINE | Admitting: INTERNAL MEDICINE
Payer: COMMERCIAL

## 2025-05-12 VITALS
RESPIRATION RATE: 15 BRPM | OXYGEN SATURATION: 99 % | HEART RATE: 74 BPM | SYSTOLIC BLOOD PRESSURE: 119 MMHG | DIASTOLIC BLOOD PRESSURE: 78 MMHG

## 2025-05-12 LAB
COLONOSCOPY: NORMAL
GLUCOSE BLDC GLUCOMTR-MCNC: 102 MG/DL (ref 70–99)

## 2025-05-12 PROCEDURE — 99153 MOD SED SAME PHYS/QHP EA: CPT | Performed by: INTERNAL MEDICINE

## 2025-05-12 PROCEDURE — 88305 TISSUE EXAM BY PATHOLOGIST: CPT | Mod: 26 | Performed by: PATHOLOGY

## 2025-05-12 PROCEDURE — 45385 COLONOSCOPY W/LESION REMOVAL: CPT | Mod: XU | Performed by: INTERNAL MEDICINE

## 2025-05-12 PROCEDURE — 99152 MOD SED SAME PHYS/QHP 5/>YRS: CPT | Performed by: INTERNAL MEDICINE

## 2025-05-12 PROCEDURE — 82962 GLUCOSE BLOOD TEST: CPT

## 2025-05-12 PROCEDURE — 88305 TISSUE EXAM BY PATHOLOGIST: CPT | Mod: TC | Performed by: INTERNAL MEDICINE

## 2025-05-12 PROCEDURE — G0500 MOD SEDAT ENDO SERVICE >5YRS: HCPCS | Performed by: INTERNAL MEDICINE

## 2025-05-12 PROCEDURE — 250N000011 HC RX IP 250 OP 636: Performed by: INTERNAL MEDICINE

## 2025-05-12 RX ORDER — FENTANYL CITRATE 50 UG/ML
INJECTION, SOLUTION INTRAMUSCULAR; INTRAVENOUS PRN
Status: DISCONTINUED | OUTPATIENT
Start: 2025-05-12 | End: 2025-05-12 | Stop reason: HOSPADM

## 2025-05-12 ASSESSMENT — ACTIVITIES OF DAILY LIVING (ADL)
ADLS_ACUITY_SCORE: 41

## 2025-05-12 NOTE — H&P
ENDOSCOPY PRE-SEDATION H&P FOR OUTPATIENT PROCEDURES    Haim Araya  [id number]  1945    Procedure: Colonoscopy     Pre-procedure diagnosis: JOSSELINE    Past medical history: No past medical history on file.  Patient Active Problem List   Diagnosis    Cerebral infarction (H)    DM (diabetes mellitus), type 2 (H)    Dyslipidemia    GERD (gastroesophageal reflux disease)    HTN (hypertension)    SDH (subdural hematoma) (H)    Autoimmune hepatitis (H)       Past surgical history:   Past Surgical History:   Procedure Laterality Date    ATTEMPTED ENDOSCOPY N/A 3/3/2025    Procedure: Colonoscopy;  Surgeon: Hamlet Decker MD;  Location: [facility]    ESOPHAGOSCOPY, GASTROSCOPY, DUODENOSCOPY (EGD), COMBINED N/A 3/3/2025    Procedure: Esophagoscopy, gastroscopy, duodenoscopy (EGD), combined;  Surgeon: Hamlet Decker MD;  Location: [facility]    IR LIVER BIOPSY PERCUTANEOUS  8/20/2024    PERCUTANEOUS BIOPSY LIVER Right 8/20/2024    Procedure: Percutaneous biopsy liver;  Surgeon: Fareed Adams MD;  [place] [facility]       No current facility-administered medications for this encounter.       Allergies   Allergen Reactions    Adhesive Tape Other (See Comments) and Rash     Holter monitor  electrodes    Aluminum Chloride Other (See Comments)     Antiperspirant ingreadiant     Erythema; Antiperspirant ingreadiant       History of Anesthesia/Sedation Problems: no    Physical Exam:    Mental status: alert  Heart: Normal  Lung: Normal  Assessment of patient's airway: Normal  Other as pertinent for procedure: None     Lab Results   Component Value Date    WBC 4.9 04/14/2025     Lab Results   Component Value Date    RBC 4.37 04/14/2025     Lab Results   Component Value Date    HGB 12.0 04/14/2025     Lab Results   Component Value Date    HCT 38.0 04/14/2025     Lab Results   Component Value Date    MCV 87 04/14/2025     Lab Results   Component Value Date    MCH 27.5 04/14/2025     Lab Results   Component Value Date     [facility] 31.6 04/14/2025     Lab Results   Component Value Date    RDW 17.9 04/14/2025     Lab Results   Component Value Date     04/14/2025     INR   Date Value Ref Range Status   08/01/2024 1.15 0.85 - 1.15 Final        ASA Score: See Provation note    Mallampati score:  II - Faucial pillars and soft palate may be seen, but uvula is masked by the base of the tongue    Assessment/Plan:     The patient is an appropriate candidate to receive sedation.    Informed consent was discussed with the patient/family, including the risks, benefits, potential complications and any alternative options associated with sedation.    Patient assessment completed just prior to sedation and while under constant observation by the provider. Condition determined to be adequate for proceeding with sedation.    The specific risks for the procedure were discussed with the patient at the time of informed consent and include but are not limited to perforation which could require surgery, missing significant neoplasm or lesion, hemorrhage and adverse sedative complication.      Hamlet Decker MD

## 2025-05-12 NOTE — OR NURSING
Procedure: Colonoscopy with polypectomy with cold snare  Sedation: conscious sedation   Specimens: x 1, sent to lab.   O2: 2L/NC  Tolerated procedure: well  Pt to recovery area in stable condition accompanied by RN.   Other:  none    Alexandria Nevarez RN

## 2025-05-13 ENCOUNTER — RESULTS FOLLOW-UP (OUTPATIENT)
Dept: GASTROENTEROLOGY | Facility: CLINIC | Age: 80
End: 2025-05-13

## 2025-05-13 LAB
PATH REPORT.COMMENTS IMP SPEC: NORMAL
PATH REPORT.COMMENTS IMP SPEC: NORMAL
PATH REPORT.FINAL DX SPEC: NORMAL
PATH REPORT.GROSS SPEC: NORMAL
PATH REPORT.MICROSCOPIC SPEC OTHER STN: NORMAL
PATH REPORT.RELEVANT HX SPEC: NORMAL
PHOTO IMAGE: NORMAL

## 2025-05-13 NOTE — RESULT ENCOUNTER NOTE
Mr. Araya:    At the time of your colonoscopy, removed a small polyp.  This polyp returned as an inflammatory polyp, which is benign and does not require follow-up.    If you have any questions about your liver disease care or tests, you can call the clinic at [phone].     - Dr. Decker

## 2025-07-23 ENCOUNTER — LAB (OUTPATIENT)
Dept: LAB | Facility: CLINIC | Age: 80
End: 2025-07-23
Payer: COMMERCIAL

## 2025-07-23 DIAGNOSIS — K75.4 AUTOIMMUNE HEPATITIS (H): ICD-10-CM

## 2025-07-23 LAB
ALBUMIN SERPL BCG-MCNC: 3.9 G/DL (ref 3.5–5.2)
ALP SERPL-CCNC: 84 U/L (ref 40–150)
ALT SERPL W P-5'-P-CCNC: 27 U/L (ref 0–70)
AST SERPL W P-5'-P-CCNC: 40 U/L (ref 0–45)
BILIRUB SERPL-MCNC: 0.7 MG/DL
BILIRUBIN DIRECT (ROCHE PRO & PURE): 0.32 MG/DL (ref 0–0.45)
ERYTHROCYTE [DISTWIDTH] IN BLOOD BY AUTOMATED COUNT: 15.7 % (ref 10–15)
HCT VFR BLD AUTO: 36.4 % (ref 40–53)
HGB BLD-MCNC: 11.6 G/DL (ref 13.3–17.7)
MCH RBC QN AUTO: 28.2 PG (ref 26.5–33)
MCHC RBC AUTO-ENTMCNC: 31.9 G/DL (ref 31.5–36.5)
MCV RBC AUTO: 88 FL (ref 78–100)
PLATELET # BLD AUTO: 159 10E3/UL (ref 150–450)
PROT SERPL-MCNC: 7 G/DL (ref 6.4–8.3)
RBC # BLD AUTO: 4.12 10E6/UL (ref 4.4–5.9)
WBC # BLD AUTO: 4.8 10E3/UL (ref 4–11)

## 2025-07-23 PROCEDURE — 85027 COMPLETE CBC AUTOMATED: CPT

## 2025-07-23 PROCEDURE — 36415 COLL VENOUS BLD VENIPUNCTURE: CPT

## 2025-07-23 PROCEDURE — 80076 HEPATIC FUNCTION PANEL: CPT

## 2025-07-27 ENCOUNTER — HEALTH MAINTENANCE LETTER (OUTPATIENT)
Age: 80
End: 2025-07-27

## (undated) DEVICE — NDL 18GAX1.5" 305185

## (undated) DEVICE — CATH IV 16X1-1/4 PROTECTIV 326210

## (undated) DEVICE — Device

## (undated) DEVICE — GLOVE BIOGEL PI ULTRATOUCH G SZ 8.0 42180

## (undated) DEVICE — BLADE KNIFE SURG 11 371111

## (undated) DEVICE — GELFOAM 7X12MM

## (undated) DEVICE — PROBE COVER INTRAOPERATIVE 5"X96" PC1308

## (undated) DEVICE — DECANTER BAG 2002S

## (undated) DEVICE — WIRE GUIDE BENSON STR .035X50CM G00661

## (undated) DEVICE — LINEN TOWEL PACK X5 5464

## (undated) DEVICE — DRSG PRIMAPORE 02X3" 7133

## (undated) DEVICE — SPECIMEN CONTAINER W/10% BUFFERED FORMALIN 120ML 591201

## (undated) DEVICE — SOL NACL 0.9% INJ 250ML BAG 2B1322Q

## (undated) DEVICE — NDL BIOPSY TEMNO 18GAX15CM ACT1815

## (undated) DEVICE — LINEN GOWN XLG 5407

## (undated) DEVICE — GOWN XLG DISP 9545

## (undated) RX ORDER — FENTANYL CITRATE 50 UG/ML
INJECTION, SOLUTION INTRAMUSCULAR; INTRAVENOUS
Status: DISPENSED
Start: 2024-08-20

## (undated) RX ORDER — ONDANSETRON 2 MG/ML
INJECTION INTRAMUSCULAR; INTRAVENOUS
Status: DISPENSED
Start: 2024-08-20

## (undated) RX ORDER — LIDOCAINE HYDROCHLORIDE 10 MG/ML
INJECTION, SOLUTION EPIDURAL; INFILTRATION; INTRACAUDAL; PERINEURAL
Status: DISPENSED
Start: 2024-08-20

## (undated) RX ORDER — FENTANYL CITRATE 50 UG/ML
INJECTION, SOLUTION INTRAMUSCULAR; INTRAVENOUS
Status: DISPENSED
Start: 2025-03-03

## (undated) RX ORDER — FENTANYL CITRATE 50 UG/ML
INJECTION, SOLUTION INTRAMUSCULAR; INTRAVENOUS
Status: DISPENSED
Start: 2025-05-12